# Patient Record
Sex: FEMALE | Race: WHITE | NOT HISPANIC OR LATINO | Employment: FULL TIME | URBAN - METROPOLITAN AREA
[De-identification: names, ages, dates, MRNs, and addresses within clinical notes are randomized per-mention and may not be internally consistent; named-entity substitution may affect disease eponyms.]

---

## 2018-10-26 LAB
HBV SURFACE AG SERPL QL IA: NEGATIVE
HIV 1+2 AB+HIV1 P24 AG SERPL QL IA: NEGATIVE
RUBELLA ANTIBODY IGG QUANTITATIVE: NORMAL IU/ML
TREPONEMA ANTIBODIES: NON REACTIVE

## 2018-11-20 LAB
C TRACH DNA SPEC QL PROBE+SIG AMP: NORMAL
N GONORRHOEA DNA SPEC QL PROBE+SIG AMP: NEGATIVE

## 2019-02-08 LAB — GROUP B STREP PCR: POSITIVE

## 2019-03-14 ENCOUNTER — ANESTHESIA EVENT (OUTPATIENT)
Dept: OBGYN | Facility: CLINIC | Age: 34
End: 2019-03-14
Payer: COMMERCIAL

## 2019-03-14 ENCOUNTER — ANESTHESIA (OUTPATIENT)
Dept: OBGYN | Facility: CLINIC | Age: 34
End: 2019-03-14
Payer: COMMERCIAL

## 2019-03-14 ENCOUNTER — HOSPITAL ENCOUNTER (INPATIENT)
Facility: CLINIC | Age: 34
LOS: 3 days | Discharge: HOME | End: 2019-03-17
Attending: OBSTETRICS & GYNECOLOGY | Admitting: OBSTETRICS & GYNECOLOGY
Payer: COMMERCIAL

## 2019-03-14 PROBLEM — Z36.89 ENCOUNTER FOR TRIAGE IN PREGNANT PATIENT: Status: ACTIVE | Noted: 2019-03-14

## 2019-03-14 LAB
ABO + RH BLD: NORMAL
ABO + RH BLD: NORMAL
AMPHETAMINES UR QL SCN: NEGATIVE
BLD GP AB SCN SERPL QL: NORMAL
BLOOD BANK CMNT PATIENT-IMP: NORMAL
CANNABINOIDS UR QL: NEGATIVE
COCAINE UR QL: NEGATIVE
HGB BLD-MCNC: 11.5 G/DL (ref 11.7–15.7)
OPIATES UR QL SCN: NEGATIVE
PCP UR QL SCN: NEGATIVE
SPECIMEN EXP DATE BLD: NORMAL

## 2019-03-14 PROCEDURE — G0463 HOSPITAL OUTPT CLINIC VISIT: HCPCS

## 2019-03-14 PROCEDURE — 3E0R3BZ INTRODUCTION OF ANESTHETIC AGENT INTO SPINAL CANAL, PERCUTANEOUS APPROACH: ICD-10-PCS | Performed by: ANESTHESIOLOGY

## 2019-03-14 PROCEDURE — 86850 RBC ANTIBODY SCREEN: CPT | Performed by: OBSTETRICS & GYNECOLOGY

## 2019-03-14 PROCEDURE — 00HU33Z INSERTION OF INFUSION DEVICE INTO SPINAL CANAL, PERCUTANEOUS APPROACH: ICD-10-PCS | Performed by: ANESTHESIOLOGY

## 2019-03-14 PROCEDURE — 25000125 ZZHC RX 250: Performed by: OBSTETRICS & GYNECOLOGY

## 2019-03-14 PROCEDURE — 37000011 ZZH ANESTHESIA WARD SERVICE

## 2019-03-14 PROCEDURE — 25000128 H RX IP 250 OP 636: Performed by: ANESTHESIOLOGY

## 2019-03-14 PROCEDURE — 40000671 ZZH STATISTIC ANESTHESIA CASE

## 2019-03-14 PROCEDURE — 25000128 H RX IP 250 OP 636: Performed by: OBSTETRICS & GYNECOLOGY

## 2019-03-14 PROCEDURE — 85018 HEMOGLOBIN: CPT | Performed by: OBSTETRICS & GYNECOLOGY

## 2019-03-14 PROCEDURE — 80307 DRUG TEST PRSMV CHEM ANLYZR: CPT | Performed by: OBSTETRICS & GYNECOLOGY

## 2019-03-14 PROCEDURE — 0064U ANTB TP TOTAL&RPR IA QUAL: CPT | Performed by: OBSTETRICS & GYNECOLOGY

## 2019-03-14 PROCEDURE — 25800030 ZZH RX IP 258 OP 636: Performed by: OBSTETRICS & GYNECOLOGY

## 2019-03-14 PROCEDURE — 10907ZC DRAINAGE OF AMNIOTIC FLUID, THERAPEUTIC FROM PRODUCTS OF CONCEPTION, VIA NATURAL OR ARTIFICIAL OPENING: ICD-10-PCS | Performed by: OBSTETRICS & GYNECOLOGY

## 2019-03-14 PROCEDURE — 12000000 ZZH R&B MED SURG/OB

## 2019-03-14 PROCEDURE — 36415 COLL VENOUS BLD VENIPUNCTURE: CPT | Performed by: OBSTETRICS & GYNECOLOGY

## 2019-03-14 PROCEDURE — 27110038 ZZH RX 271: Performed by: ANESTHESIOLOGY

## 2019-03-14 PROCEDURE — 25800030 ZZH RX IP 258 OP 636: Performed by: ANESTHESIOLOGY

## 2019-03-14 PROCEDURE — 86901 BLOOD TYPING SEROLOGIC RH(D): CPT | Performed by: OBSTETRICS & GYNECOLOGY

## 2019-03-14 PROCEDURE — 86900 BLOOD TYPING SEROLOGIC ABO: CPT | Performed by: OBSTETRICS & GYNECOLOGY

## 2019-03-14 PROCEDURE — 25000125 ZZHC RX 250: Performed by: ANESTHESIOLOGY

## 2019-03-14 RX ORDER — METHYLERGONOVINE MALEATE 0.2 MG/ML
200 INJECTION INTRAVENOUS
Status: DISCONTINUED | OUTPATIENT
Start: 2019-03-14 | End: 2019-03-15

## 2019-03-14 RX ORDER — BUPIVACAINE HYDROCHLORIDE 2.5 MG/ML
INJECTION, SOLUTION INFILTRATION; PERINEURAL PRN
Status: DISCONTINUED | OUTPATIENT
Start: 2019-03-14 | End: 2019-03-14

## 2019-03-14 RX ORDER — FENTANYL CITRATE 50 UG/ML
50-100 INJECTION, SOLUTION INTRAMUSCULAR; INTRAVENOUS
Status: DISCONTINUED | OUTPATIENT
Start: 2019-03-14 | End: 2019-03-15

## 2019-03-14 RX ORDER — NALBUPHINE HYDROCHLORIDE 10 MG/ML
2.5-5 INJECTION, SOLUTION INTRAMUSCULAR; INTRAVENOUS; SUBCUTANEOUS EVERY 6 HOURS PRN
Status: DISCONTINUED | OUTPATIENT
Start: 2019-03-14 | End: 2019-03-15

## 2019-03-14 RX ORDER — CARBOPROST TROMETHAMINE 250 UG/ML
250 INJECTION, SOLUTION INTRAMUSCULAR
Status: DISCONTINUED | OUTPATIENT
Start: 2019-03-14 | End: 2019-03-15

## 2019-03-14 RX ORDER — IBUPROFEN 800 MG/1
800 TABLET, FILM COATED ORAL
Status: DISCONTINUED | OUTPATIENT
Start: 2019-03-14 | End: 2019-03-15

## 2019-03-14 RX ORDER — PENICILLIN G POTASSIUM 5000000 [IU]/1
5 INJECTION, POWDER, FOR SOLUTION INTRAMUSCULAR; INTRAVENOUS ONCE
Status: COMPLETED | OUTPATIENT
Start: 2019-03-14 | End: 2019-03-14

## 2019-03-14 RX ORDER — OXYTOCIN 10 [USP'U]/ML
10 INJECTION, SOLUTION INTRAMUSCULAR; INTRAVENOUS
Status: DISCONTINUED | OUTPATIENT
Start: 2019-03-14 | End: 2019-03-15

## 2019-03-14 RX ORDER — BUPIVACAINE HCL/0.9 % NACL/PF 0.125 %
15 PLASTIC BAG, INJECTION (ML) EPIDURAL CONTINUOUS
Status: DISCONTINUED | OUTPATIENT
Start: 2019-03-14 | End: 2019-03-15

## 2019-03-14 RX ORDER — OXYTOCIN/0.9 % SODIUM CHLORIDE 30/500 ML
100-340 PLASTIC BAG, INJECTION (ML) INTRAVENOUS CONTINUOUS PRN
Status: DISCONTINUED | OUTPATIENT
Start: 2019-03-14 | End: 2019-03-15

## 2019-03-14 RX ORDER — OXYTOCIN/0.9 % SODIUM CHLORIDE 30/500 ML
1-24 PLASTIC BAG, INJECTION (ML) INTRAVENOUS CONTINUOUS
Status: DISCONTINUED | OUTPATIENT
Start: 2019-03-14 | End: 2019-03-15

## 2019-03-14 RX ORDER — TERBUTALINE SULFATE 1 MG/ML
0.25 INJECTION, SOLUTION SUBCUTANEOUS
Status: DISCONTINUED | OUTPATIENT
Start: 2019-03-14 | End: 2019-03-15

## 2019-03-14 RX ORDER — OXYCODONE AND ACETAMINOPHEN 5; 325 MG/1; MG/1
1 TABLET ORAL
Status: DISCONTINUED | OUTPATIENT
Start: 2019-03-14 | End: 2019-03-15

## 2019-03-14 RX ORDER — NALOXONE HYDROCHLORIDE 0.4 MG/ML
.1-.4 INJECTION, SOLUTION INTRAMUSCULAR; INTRAVENOUS; SUBCUTANEOUS
Status: DISCONTINUED | OUTPATIENT
Start: 2019-03-14 | End: 2019-03-15

## 2019-03-14 RX ORDER — SODIUM CHLORIDE, SODIUM LACTATE, POTASSIUM CHLORIDE, CALCIUM CHLORIDE 600; 310; 30; 20 MG/100ML; MG/100ML; MG/100ML; MG/100ML
INJECTION, SOLUTION INTRAVENOUS CONTINUOUS
Status: DISCONTINUED | OUTPATIENT
Start: 2019-03-14 | End: 2019-03-15

## 2019-03-14 RX ORDER — EPHEDRINE SULFATE 50 MG/ML
5 INJECTION, SOLUTION INTRAMUSCULAR; INTRAVENOUS; SUBCUTANEOUS
Status: DISCONTINUED | OUTPATIENT
Start: 2019-03-14 | End: 2019-03-15

## 2019-03-14 RX ORDER — ONDANSETRON 2 MG/ML
4 INJECTION INTRAMUSCULAR; INTRAVENOUS EVERY 6 HOURS PRN
Status: DISCONTINUED | OUTPATIENT
Start: 2019-03-14 | End: 2019-03-15

## 2019-03-14 RX ORDER — LIDOCAINE 40 MG/G
CREAM TOPICAL
Status: DISCONTINUED | OUTPATIENT
Start: 2019-03-14 | End: 2019-03-15

## 2019-03-14 RX ORDER — ACETAMINOPHEN 325 MG/1
650 TABLET ORAL EVERY 4 HOURS PRN
Status: DISCONTINUED | OUTPATIENT
Start: 2019-03-14 | End: 2019-03-15

## 2019-03-14 RX ADMIN — SODIUM CHLORIDE 2.5 MILLION UNITS: 9 INJECTION, SOLUTION INTRAVENOUS at 22:54

## 2019-03-14 RX ADMIN — EPHEDRINE SULFATE 5 MG: 50 INJECTION, SOLUTION INTRAVENOUS at 21:03

## 2019-03-14 RX ADMIN — Medication 15 ML/HR: at 20:12

## 2019-03-14 RX ADMIN — ONDANSETRON 4 MG: 2 INJECTION INTRAMUSCULAR; INTRAVENOUS at 22:16

## 2019-03-14 RX ADMIN — PENICILLIN G POTASSIUM 5 MILLION UNITS: 5000000 POWDER, FOR SOLUTION INTRAMUSCULAR; INTRAPLEURAL; INTRATHECAL; INTRAVENOUS at 19:00

## 2019-03-14 RX ADMIN — OXYTOCIN-SODIUM CHLORIDE 0.9% IV SOLN 30 UNIT/500ML 2 MILLI-UNITS/MIN: 30-0.9/5 SOLUTION at 23:28

## 2019-03-14 RX ADMIN — SODIUM CHLORIDE, POTASSIUM CHLORIDE, SODIUM LACTATE AND CALCIUM CHLORIDE 1000 ML: 600; 310; 30; 20 INJECTION, SOLUTION INTRAVENOUS at 19:15

## 2019-03-14 RX ADMIN — BUPIVACAINE HYDROCHLORIDE 15 ML: 2.5 INJECTION, SOLUTION INFILTRATION; PERINEURAL at 20:02

## 2019-03-14 RX ADMIN — SODIUM CHLORIDE, POTASSIUM CHLORIDE, SODIUM LACTATE AND CALCIUM CHLORIDE: 600; 310; 30; 20 INJECTION, SOLUTION INTRAVENOUS at 20:22

## 2019-03-14 SDOH — HEALTH STABILITY: MENTAL HEALTH: HOW OFTEN DO YOU HAVE A DRINK CONTAINING ALCOHOL?: NEVER

## 2019-03-14 ASSESSMENT — ACTIVITIES OF DAILY LIVING (ADL)
TRANSFERRING: 0-->INDEPENDENT
SWALLOWING: 0-->SWALLOWS FOODS/LIQUIDS WITHOUT DIFFICULTY
RETIRED_COMMUNICATION: 0-->UNDERSTANDS/COMMUNICATES WITHOUT DIFFICULTY
BATHING: 0-->INDEPENDENT
AMBULATION: 0-->INDEPENDENT
RETIRED_EATING: 0-->INDEPENDENT
FALL_HISTORY_WITHIN_LAST_SIX_MONTHS: NO
TOILETING: 0-->INDEPENDENT
COGNITION: 0 - NO COGNITION ISSUES REPORTED
DRESS: 0-->INDEPENDENT

## 2019-03-14 NOTE — PLAN OF CARE
Data: Patient presented to [facility]: 3/14/2019  3:59 PM.  Reason for maternal/fetal assessment is uterine contractions. Patient reports that she has been jarvis for the last few days but today they are stronger and closer together.  Patient is a .  Prenatal record reviewed. Pregnancy has been uncomplicated..  Gestational Age 40w6d. VSS. Fetal movement present. Patient denies leaking of vaginal fluid/rupture of membranes, vaginal bleeding, nausea, vomiting, headache, visual disturbances, epigastric or URQ pain, significant edema. Support person is not present.   Action: Verbal consent for EFM. Triage assessment completed. Bill of rights reviewed.  Response: Patient verbalized agreement with plan. Will contact Dr Elizabeth Weinberg with update and further orders.

## 2019-03-14 NOTE — PROVIDER NOTIFICATION
03/14/19 1627   Provider Notification   Provider Name/Title Dr Weinberg   Method of Notification Phone   Request Evaluate - Remote   Notification Reason Patient Arrived;KEYSHAE     MD updated on pt arrival, SVE and that we have not noted or palpated any contractions at this time. Orders to obtain reactive NST and offer pt to call clinic and set up induction if contractions aren't noted. Call MD with pt decision. Ok to discharge home after reactive NST.

## 2019-03-14 NOTE — PROVIDER NOTIFICATION
03/14/19 1829   Provider Notification   Provider Name/Title Dr Klein    Method of Notification Phone   Request Evaluate - Remote   Notification Reason SVE;Status Update;Pain;Uterine Activity;Labor Status   Dr Klein updated on the progression of labor for Nelya, Orders received to make patient a in Patient. Epidural ok, PCN for + GBS.

## 2019-03-15 LAB
ALT SERPL W P-5'-P-CCNC: 19 U/L (ref 0–50)
AST SERPL W P-5'-P-CCNC: 20 U/L (ref 0–45)
BLOOD BANK CMNT PATIENT-IMP: NORMAL
BLOOD BANK CMNT PATIENT-IMP: NORMAL
CREAT SERPL-MCNC: 0.49 MG/DL (ref 0.52–1.04)
ERYTHROCYTE [DISTWIDTH] IN BLOOD BY AUTOMATED COUNT: 15.8 % (ref 10–15)
GFR SERPL CREATININE-BSD FRML MDRD: >90 ML/MIN/{1.73_M2}
HCT VFR BLD AUTO: 33.8 % (ref 35–47)
HGB BLD-MCNC: 10.4 G/DL (ref 11.7–15.7)
MCH RBC QN AUTO: 27 PG (ref 26.5–33)
MCHC RBC AUTO-ENTMCNC: 30.8 G/DL (ref 31.5–36.5)
MCV RBC AUTO: 88 FL (ref 78–100)
PLATELET # BLD AUTO: 218 10E9/L (ref 150–450)
RBC # BLD AUTO: 3.85 10E12/L (ref 3.8–5.2)
T PALLIDUM AB SER QL: NONREACTIVE
WBC # BLD AUTO: 12.4 10E9/L (ref 4–11)

## 2019-03-15 PROCEDURE — 84450 TRANSFERASE (AST) (SGOT): CPT | Performed by: OBSTETRICS & GYNECOLOGY

## 2019-03-15 PROCEDURE — 25800030 ZZH RX IP 258 OP 636: Performed by: OBSTETRICS & GYNECOLOGY

## 2019-03-15 PROCEDURE — 40000083 ZZH STATISTIC IP LACTATION SERVICES 1-15 MIN

## 2019-03-15 PROCEDURE — 12000000 ZZH R&B MED SURG/OB

## 2019-03-15 PROCEDURE — 72200001 ZZH LABOR CARE VAGINAL DELIVERY SINGLE

## 2019-03-15 PROCEDURE — 25000132 ZZH RX MED GY IP 250 OP 250 PS 637: Performed by: OBSTETRICS & GYNECOLOGY

## 2019-03-15 PROCEDURE — 84460 ALANINE AMINO (ALT) (SGPT): CPT | Performed by: OBSTETRICS & GYNECOLOGY

## 2019-03-15 PROCEDURE — 85027 COMPLETE CBC AUTOMATED: CPT | Performed by: OBSTETRICS & GYNECOLOGY

## 2019-03-15 PROCEDURE — 36415 COLL VENOUS BLD VENIPUNCTURE: CPT | Performed by: OBSTETRICS & GYNECOLOGY

## 2019-03-15 PROCEDURE — 82565 ASSAY OF CREATININE: CPT | Performed by: OBSTETRICS & GYNECOLOGY

## 2019-03-15 PROCEDURE — 25000128 H RX IP 250 OP 636: Performed by: OBSTETRICS & GYNECOLOGY

## 2019-03-15 RX ORDER — ACETAMINOPHEN 325 MG/1
650 TABLET ORAL EVERY 4 HOURS PRN
Status: DISCONTINUED | OUTPATIENT
Start: 2019-03-15 | End: 2019-03-15

## 2019-03-15 RX ORDER — OXYTOCIN 10 [USP'U]/ML
10 INJECTION, SOLUTION INTRAMUSCULAR; INTRAVENOUS
Status: DISCONTINUED | OUTPATIENT
Start: 2019-03-15 | End: 2019-03-15

## 2019-03-15 RX ORDER — OXYTOCIN/0.9 % SODIUM CHLORIDE 30/500 ML
340 PLASTIC BAG, INJECTION (ML) INTRAVENOUS CONTINUOUS PRN
Status: DISCONTINUED | OUTPATIENT
Start: 2019-03-15 | End: 2019-03-15

## 2019-03-15 RX ORDER — OXYTOCIN/0.9 % SODIUM CHLORIDE 30/500 ML
100 PLASTIC BAG, INJECTION (ML) INTRAVENOUS CONTINUOUS
Status: DISCONTINUED | OUTPATIENT
Start: 2019-03-15 | End: 2019-03-15

## 2019-03-15 RX ORDER — OXYTOCIN/0.9 % SODIUM CHLORIDE 30/500 ML
340 PLASTIC BAG, INJECTION (ML) INTRAVENOUS CONTINUOUS PRN
Status: DISCONTINUED | OUTPATIENT
Start: 2019-03-15 | End: 2019-03-17 | Stop reason: HOSPADM

## 2019-03-15 RX ORDER — LANOLIN 100 %
OINTMENT (GRAM) TOPICAL
Status: DISCONTINUED | OUTPATIENT
Start: 2019-03-15 | End: 2019-03-15

## 2019-03-15 RX ORDER — OXYTOCIN/0.9 % SODIUM CHLORIDE 30/500 ML
100 PLASTIC BAG, INJECTION (ML) INTRAVENOUS CONTINUOUS
Status: DISCONTINUED | OUTPATIENT
Start: 2019-03-15 | End: 2019-03-17 | Stop reason: HOSPADM

## 2019-03-15 RX ORDER — BISACODYL 10 MG
10 SUPPOSITORY, RECTAL RECTAL DAILY PRN
Status: DISCONTINUED | OUTPATIENT
Start: 2019-03-17 | End: 2019-03-17 | Stop reason: HOSPADM

## 2019-03-15 RX ORDER — HYDROCORTISONE 2.5 %
CREAM (GRAM) TOPICAL 3 TIMES DAILY PRN
Status: DISCONTINUED | OUTPATIENT
Start: 2019-03-15 | End: 2019-03-15

## 2019-03-15 RX ORDER — OXYTOCIN 10 [USP'U]/ML
10 INJECTION, SOLUTION INTRAMUSCULAR; INTRAVENOUS
Status: DISCONTINUED | OUTPATIENT
Start: 2019-03-15 | End: 2019-03-17 | Stop reason: HOSPADM

## 2019-03-15 RX ORDER — ACETAMINOPHEN 325 MG/1
650 TABLET ORAL EVERY 4 HOURS PRN
Status: DISCONTINUED | OUTPATIENT
Start: 2019-03-15 | End: 2019-03-17 | Stop reason: HOSPADM

## 2019-03-15 RX ORDER — IBUPROFEN 800 MG/1
800 TABLET, FILM COATED ORAL EVERY 6 HOURS PRN
Status: DISCONTINUED | OUTPATIENT
Start: 2019-03-15 | End: 2019-03-15

## 2019-03-15 RX ORDER — NALOXONE HYDROCHLORIDE 0.4 MG/ML
.1-.4 INJECTION, SOLUTION INTRAMUSCULAR; INTRAVENOUS; SUBCUTANEOUS
Status: DISCONTINUED | OUTPATIENT
Start: 2019-03-15 | End: 2019-03-17 | Stop reason: HOSPADM

## 2019-03-15 RX ORDER — IBUPROFEN 800 MG/1
800 TABLET, FILM COATED ORAL EVERY 6 HOURS PRN
Status: DISCONTINUED | OUTPATIENT
Start: 2019-03-15 | End: 2019-03-17 | Stop reason: HOSPADM

## 2019-03-15 RX ORDER — AMOXICILLIN 250 MG
2 CAPSULE ORAL 2 TIMES DAILY
Status: DISCONTINUED | OUTPATIENT
Start: 2019-03-15 | End: 2019-03-17 | Stop reason: HOSPADM

## 2019-03-15 RX ORDER — NALOXONE HYDROCHLORIDE 0.4 MG/ML
.1-.4 INJECTION, SOLUTION INTRAMUSCULAR; INTRAVENOUS; SUBCUTANEOUS
Status: DISCONTINUED | OUTPATIENT
Start: 2019-03-15 | End: 2019-03-15

## 2019-03-15 RX ORDER — HYDROCORTISONE 2.5 %
CREAM (GRAM) TOPICAL 3 TIMES DAILY PRN
Status: DISCONTINUED | OUTPATIENT
Start: 2019-03-15 | End: 2019-03-17 | Stop reason: HOSPADM

## 2019-03-15 RX ORDER — LANOLIN 100 %
OINTMENT (GRAM) TOPICAL
Status: DISCONTINUED | OUTPATIENT
Start: 2019-03-15 | End: 2019-03-17 | Stop reason: HOSPADM

## 2019-03-15 RX ORDER — AMOXICILLIN 250 MG
2 CAPSULE ORAL 2 TIMES DAILY
Status: DISCONTINUED | OUTPATIENT
Start: 2019-03-15 | End: 2019-03-15

## 2019-03-15 RX ORDER — AMOXICILLIN 250 MG
1 CAPSULE ORAL 2 TIMES DAILY
Status: DISCONTINUED | OUTPATIENT
Start: 2019-03-15 | End: 2019-03-15

## 2019-03-15 RX ORDER — AMOXICILLIN 250 MG
1 CAPSULE ORAL 2 TIMES DAILY
Status: DISCONTINUED | OUTPATIENT
Start: 2019-03-15 | End: 2019-03-17 | Stop reason: HOSPADM

## 2019-03-15 RX ORDER — BISACODYL 10 MG
10 SUPPOSITORY, RECTAL RECTAL DAILY PRN
Status: DISCONTINUED | OUTPATIENT
Start: 2019-03-17 | End: 2019-03-15

## 2019-03-15 RX ADMIN — SENNOSIDES AND DOCUSATE SODIUM 1 TABLET: 8.6; 5 TABLET ORAL at 08:32

## 2019-03-15 RX ADMIN — IBUPROFEN 800 MG: 800 TABLET ORAL at 12:12

## 2019-03-15 RX ADMIN — ACETAMINOPHEN 650 MG: 325 TABLET, FILM COATED ORAL at 10:59

## 2019-03-15 RX ADMIN — SODIUM CHLORIDE 2.5 MILLION UNITS: 9 INJECTION, SOLUTION INTRAVENOUS at 02:55

## 2019-03-15 RX ADMIN — IBUPROFEN 800 MG: 800 TABLET ORAL at 05:16

## 2019-03-15 RX ADMIN — SENNOSIDES AND DOCUSATE SODIUM 1 TABLET: 8.6; 5 TABLET ORAL at 21:33

## 2019-03-15 RX ADMIN — ACETAMINOPHEN 650 MG: 325 TABLET, FILM COATED ORAL at 16:54

## 2019-03-15 RX ADMIN — IBUPROFEN 800 MG: 800 TABLET ORAL at 18:11

## 2019-03-15 RX ADMIN — ACETAMINOPHEN 650 MG: 325 TABLET, FILM COATED ORAL at 21:36

## 2019-03-15 RX ADMIN — SODIUM CHLORIDE, POTASSIUM CHLORIDE, SODIUM LACTATE AND CALCIUM CHLORIDE: 600; 310; 30; 20 INJECTION, SOLUTION INTRAVENOUS at 02:15

## 2019-03-15 NOTE — PROVIDER NOTIFICATION
03/14/19 2300   Provider Notification   Provider Name/Title Dr. Klein   Method of Notification Phone   Request Evaluate - Remote   Notification Reason SVE;Status Update;Labor Status;Uterine Activity   SVE 6.5/70/-2. CTX q 4- 16 minutes. Patient comfortable with epidural. 2nd dose of antibiotics infusing. Orders received for Pitocin augmentation. POC discussed with patient and significant other.

## 2019-03-15 NOTE — ANESTHESIA POSTPROCEDURE EVALUATION
Patient: Chris Santo      S/P epidural for labor.   I or my partner was immediately available for management of this patient during epidural analgesia infusion.  VSS.  Doing well. Block resolved.  Neuro at baseline. Denies positional headache. Minimal side effects easily managed w/ PRN meds. No apparent anesthetic complications. No follow-up required.    Mac Delacruz MD    Note:  Anesthesia Post Evaluation    Last vitals:  Vitals:    03/15/19 0603 03/15/19 0618 03/15/19 0633   BP: 145/81 139/77 144/76   Resp: 16 16    Temp:            Electronically Signed By: Mac Delacruz MD  March 15, 2019  7:51 AM

## 2019-03-15 NOTE — PROGRESS NOTES
Patient unavailable as medical provider in room when Public Health Nurse (PHN) stopped by to discuss [facility] Public Health resources.

## 2019-03-15 NOTE — ANESTHESIA PREPROCEDURE EVALUATION
"Anesthesia Pre-Procedure Evaluation    Patient: Chris Santo   MRN: [id number] : 1985          Preoperative Diagnosis: * No surgery found *        History reviewed. No pertinent past medical history.  History reviewed. No pertinent surgical history.  Anesthesia Evaluation       history and physical reviewed .             ROS/MED HX    ENT/Pulmonary:  - neg pulmonary ROS     Neurologic:  - neg neurologic ROS     Cardiovascular:  - neg cardiovascular ROS       METS/Exercise Tolerance:     Hematologic:         Musculoskeletal:         GI/Hepatic:  - neg GI/hepatic ROS       Renal/Genitourinary:         Endo:         Psychiatric:         Infectious Disease:         Malignancy:         Other:                           Class 3 obesity      Physical Exam  Normal systems: cardiovascular, pulmonary and dental    Airway   Mallampati: II  TM distance: > 3 FB  Neck ROM: full  Mouth opening: > 3 cm    Dental     Cardiovascular       Pulmonary     Other findings: Lab Test        19                       1835          0710          0745          HGB          11.5*        9.7*         9.7*           No lab results found.                Lab Results   Component Value Date    HGB 11.5 (L) 2019       Preop Vitals  BP Readings from Last 3 Encounters:   14 114/67    Pulse Readings from Last 3 Encounters:   14 91      Resp Readings from Last 3 Encounters:   19 16   14 16    SpO2 Readings from Last 3 Encounters:   No data found for SpO2      Temp Readings from Last 1 Encounters:   19 98.3  F (36.8  C) (Oral)    Ht Readings from Last 1 Encounters:   14 1.676 m (5' 6\")      Wt Readings from Last 1 Encounters:   14 113.4 kg (250 lb)    Estimated body mass index is 40.35 kg/m  as calculated from the following:    Height as of 14: 1.676 m (5' 6\").    Weight as of 14: 113.4 kg (250 lb).       Anesthesia Plan      History & Physical " Review      ASA Status:  .  OB Epidural Asa: 3            Postoperative Care      Consents  Anesthetic plan, risks, benefits and alternatives discussed with:  Patient..                 Jose Ramon Adrian MD                    .

## 2019-03-15 NOTE — PROVIDER NOTIFICATION
03/14/19 1940   Provider Notification   Provider Name/Title Dr. Klein   Method of Notification Phone   Request Evaluate - Remote   Notification Reason SVE;Pain;Labor Status;Uterine Activity   SVE 5/70/-2. Awaiting epidural. Will update MD with changes.

## 2019-03-15 NOTE — LACTATION NOTE
Lactation visit. Mom reports baby is NW and no pain even with mild tongue tie. Encouraged mom to call us PRN.

## 2019-03-15 NOTE — PROVIDER NOTIFICATION
03/15/19 0520   Provider Notification   Provider Name/Title Dr. Klein   Method of Notification In Department   Request Evaluate - Remote   Notification Reason Lab/Diagnostic Study;Maternal Vital Sign Change   MD notified of elevated BPs. Verbal orders received for [facility] labs drawn.

## 2019-03-15 NOTE — PLAN OF CARE
Pt admitted to 450 at change of shift. Iv to saline lock at 0830. Vss. Flow small rubra. Denies pain. Pt oriented to room, call light in reach. Pt to call for help to bathroom. Pt up to bathroom at 1100 with assist of one, complaining of slight dizziness when up. Fundus firm at U/1, flow small to moderate rubra. Tylenol given per pt request.

## 2019-03-15 NOTE — PLAN OF CARE
Data: Chris Santo transferred to 450 via wheelchair at 0650. Baby transferred via parent's arms.  Action: Receiving unit notified of transfer: Yes. Patient and family notified of room change. Report given to Janie RN at 0715. Belongings sent to receiving unit. Accompanied by Registered Nurse. Oriented patient to surroundings. Call light within reach. ID bands double-checked with receiving RN.  Response: Patient tolerated transfer and is stable.

## 2019-03-15 NOTE — ANESTHESIA PROCEDURE NOTES
Peripheral nerve/Neuraxial procedure note :        Assessment/Narrative  .  .  . Comments:  Pt seen and interviewed prior to epidural placement for labor analgesia.  This epidural is to be placed in anticipation of normal vaginal delivery.  Risk discussed and consent given prior to performance of procedure.  Time out consisting of identification of patient and desire for epidural analgesia was confirmed.  Sterile prep of lumbar spine was accomplished with povidone iodine three times.  L34 interspace was identified.  Local anesthetic infiltration with 1.5% lido with epi 1/200k was performed with 1.5 cc.  17 G Tuohy needle was advanced in the midline with loss of resistance technique.  No CSF, blood, or paresthesias were noted with placement.  Test dose of 1.5% Lidocaine with epi 1/200k, 3 cc., was injected without evidence of intrathecal or intravascular injection.  Incremental bolus of 0.25% Bupivicaine was injected to a total volume of 15 cc.  Epidural cath 19 G was advanced through needle approx. 6 cm.  No paresthesia was noted with placement and aspiration of cath was negative for blood.  Catheter secured with tegaderm and tape.  Epidural infusion begun after double check of pump settings.  Nursing to inform if there are any complications, but none were noted prior to leaving patient room.  I, or my partners, remain immediately available for management of any issues or complications.  I, or my partners, will monitor at appropriate intervals.  ERNESTO Adrian MD

## 2019-03-15 NOTE — L&D DELIVERY NOTE
Delivery Summary    Chris Santo MRN# [id number]   Age: 34 year old YOB: 1985     ASSESSMENT & PLAN: Routine PPOB Vaginal Delivery Note      HPI:  Pt is a 34 year old  @ 41w0d who presented to L&D on 3/14/2019 for early labor.            Prenatal labs:  B+ antibody screen: negative, Rubella immune,  Hep B/HIV/RPR all negative, GC/CT negative, , GBS positive    Pregnancy complications:  Macrosomia, +GBS,BMI>40.    Hospital Course:    First Stage: On admission, contractions were every 2-4 minutes and patient was 60%/3-4/-2 dilated. FHTs were in the 165 with accelerations present. moderate variability,  no decelerations noted.   Abdomen was non-tender.  EFW was 9.5# by Leopold's.  Patient's labor slowly progressed.    At the patient's request, she received epidural  analgesia.  She did receive pitocin for augmentation of labor of labor, to a maximum of 5mu/min.  AROM occurred at 0415 with meconium stained fluid noted.  Patient reached complete cervical dilation at 0406 on 3/15/2019.    Second Stage:  Patient did not  labor down , and began pushing at 0416.  Good maternal expulsive efforts were noted.  Fetal heart tones remained reassuring during the second stage.  Baseline 135, with moderate variability, no decelerations noted.  She was able to bring the fetal vertex to a full crown.  The fetal vertex was then easily delivered, followed by the fetal shoulders without complications.  A  nuchal cord was not present.  A female infant was then delivered with mild shoulder dystocia at 0428 on 3/15/2019. There was a compound delivery with a hand presenting. The baby deliveredafter 30 seconds. The mouth and nares were bulb suctioned.  The cord was clamped after it had stopped pulsating, cut and the infant was placed on the mother's abdomen.  The infant's weight was 9 pounds 6 ounces.  Apgars were 9 and 9 at one and five minutes .       Third Stage:  The placenta then delivered at 0447 .  It  "was noted to be intact with a three vessel cord.  The patient's perineum was inspected and was intact .  The area was infiltrated with 1% lidocaine and repaired in the usual fashion using 3-0 vicryl suture.  EBL for the procedure was 908cc.    Sponge and needle counts were correct.  The patient and infant remained in the delivery suite following delivery in stable condition.    Elen Klein MD  3/15/2019  5:27 AM               Labor Event Times    Labor onset date:  3/15/19 Onset time:   4:18 PM   Dilation complete date:  3/15/19 Complete time:   4:06 AM   Start pushing date/time:  3/15/2019 0416      Rupture date/time: 3/15/19 0415   Rupture type:  Artificial Rupture of Membranes  Fluid color:  Meconium     Delivery/Placenta Date and Time    Delivery Date:  3/15/19 Delivery Time:   4:28 AM   Placenta Date/Time:  3/15/2019  4:47 AM  Oxytocin given at the time of delivery:  after delivery of baby     Vaginal Counts     Initial count performed by 2 team members:   Two Team Members   BELLE Mayorga       Crockett Suture Crockett Sponges Instruments   Initial counts 2  5    Added to count       Final counts 2  5    Placed during labor Accounted for at the end of labor   No NA   No NA   No NA    Final count performed by 2 team members:   Two Team Members   BELLE amaro Dr.      Final count correct?:  Yes     Apgars    Living status:  Living   1 Minute 5 Minute 10 Minute 15 Minute 20 Minute   Skin color: 1  1       Heart rate: 2  2       Reflex irritability: 2  2       Muscle tone: 2  2       Respiratory effort: 2  2       Total: 9  9       Apgars assigned by:  RAUL NGUYEN     Cord    Vessels:  3 Vessels Complications:  None   Cord Blood Disposition:  Lab Gases Sent?:  No      Bandy Resuscitation    Methods:  None  Output in Delivery Room:  Stool      Measurements    Weight:  9 lb 5.9 oz Length:  1' 9.5\"   Head circumference:  34.3 cm       Labor Events and Shoulder " Dystocia    Fetal Tracing Prior to Delivery:  Category 2  Shoulder dystocia present?:  Pos  Time recognized:  3/15/2019 0427    Time help called:  3/15/2019 0427    Additional staff arrived:  3/15/2019 0425       First Maneuver:  Marah maneuver    Time performed:  3/15/2019 0428       Delivery (Maternal) (Provider to Complete) (058235)    Episiotomy:  None  Perineal lacerations:  None       Blood Loss  Mother: Chris Santo #[id number]   Start of Mother's Information    IO Blood Loss  03/15/19 1618 - 03/15/19 0521    None           End of Mother's Information  Mother: hCris Santo #[id number]         Delivery - Provider to Complete (094954)    Attempted Delivery Types (Choose all that apply):  Spontaneous Vaginal Delivery  Delivery Type (Choose the 1 that will go to the Birth History):  Vaginal, Spontaneous   Other personnel:   Provider Role   Dasha Childers RN Registered Nurse         Placenta    Immediate Cord Clamping:  Done  Date/Time:  3/15/2019  4:47 AM  Removal:  Spontaneous  Disposition:  Hospital disposal     Anesthesia    Method:  Epidural   Analgesic:   BIRTH HISTORY: ANALGESIC   BUPIVACAINE 0.125%  ML NS EPIDURAL DRIP         Presentation and Position    Presentation:  Vertex  Position:  Right Occiput Anterior           Elen Klein MD

## 2019-03-16 LAB — HGB BLD-MCNC: 9.1 G/DL (ref 11.7–15.7)

## 2019-03-16 PROCEDURE — 25000132 ZZH RX MED GY IP 250 OP 250 PS 637: Performed by: OBSTETRICS & GYNECOLOGY

## 2019-03-16 PROCEDURE — 40000083 ZZH STATISTIC IP LACTATION SERVICES 1-15 MIN

## 2019-03-16 PROCEDURE — 85018 HEMOGLOBIN: CPT | Performed by: OBSTETRICS & GYNECOLOGY

## 2019-03-16 PROCEDURE — 36415 COLL VENOUS BLD VENIPUNCTURE: CPT | Performed by: OBSTETRICS & GYNECOLOGY

## 2019-03-16 PROCEDURE — 12000000 ZZH R&B MED SURG/OB

## 2019-03-16 RX ADMIN — Medication 140 MG: at 08:57

## 2019-03-16 RX ADMIN — IBUPROFEN 800 MG: 800 TABLET ORAL at 22:27

## 2019-03-16 RX ADMIN — IBUPROFEN 800 MG: 800 TABLET ORAL at 15:41

## 2019-03-16 RX ADMIN — SENNOSIDES AND DOCUSATE SODIUM 1 TABLET: 8.6; 5 TABLET ORAL at 08:57

## 2019-03-16 RX ADMIN — SENNOSIDES AND DOCUSATE SODIUM 1 TABLET: 8.6; 5 TABLET ORAL at 20:52

## 2019-03-16 RX ADMIN — IBUPROFEN 800 MG: 800 TABLET ORAL at 02:48

## 2019-03-16 RX ADMIN — IBUPROFEN 800 MG: 800 TABLET ORAL at 08:57

## 2019-03-16 RX ADMIN — ACETAMINOPHEN 650 MG: 325 TABLET, FILM COATED ORAL at 19:30

## 2019-03-16 RX ADMIN — ACETAMINOPHEN 650 MG: 325 TABLET, FILM COATED ORAL at 02:48

## 2019-03-16 NOTE — PLAN OF CARE
Patient meeting expected outcomes. Pain adequately managed with tylenol and ibuprofen. Breastfeeding well, also formula feeding via bottle. Independent with self and  cares.

## 2019-03-16 NOTE — PLAN OF CARE
Patient meeting expected goals. Is up independent in room, meeting all personal and infant needs. VSS. Pain is being managed with Tylenol and Ibuprofen.  Is both breastfeeding infant and bottle feeding formula.  Encouraged to feed infant every 2-3 hours; waking infant up. Patient agreed.

## 2019-03-16 NOTE — PROGRESS NOTES
OB Post-partum Note  PPD# 1    S:  Patient doing well.  Pain controlled.  Voiding.  Bleeding is normal.  Breast feeding.    O:  /79   Pulse 93   Temp 97.9  F (36.6  C) (Oral)   Resp 18   Breastfeeding? yes  Gen- A&O, NAD  Abd- Non-tender, fundus non tender and firm   Ext- non-tender, no calf pain    Hemoglobin   Date Value Ref Range Status   2019 9.1 (L) 11.7 - 15.7 g/dL Final     B  Rubella Immune    A/P: 34 year old  PPD# 1 s/p     1.  Routine post-partum cares  2.  Analgesia tylenol and ibuprofen  3.  Discharge tomorrow  4.  The plan of care was discussed with the patient.  She expressed understanding and agreement  5. Blood loss anemia - Fe supplement.      Cierra Abdul  3/16/2019  8:19 AM

## 2019-03-16 NOTE — PLAN OF CARE
Cramping pain is controlled with ibuprofen and tylenol, up and moving well in room and hallway, feeding infant with formula, has had visitors this evening, encouraged to fill out the paperwork, cooperative, pleasant, bonding with baby well.

## 2019-03-17 VITALS
HEART RATE: 84 BPM | DIASTOLIC BLOOD PRESSURE: 74 MMHG | RESPIRATION RATE: 18 BRPM | TEMPERATURE: 98.4 F | SYSTOLIC BLOOD PRESSURE: 135 MMHG

## 2019-03-17 PROCEDURE — 25000132 ZZH RX MED GY IP 250 OP 250 PS 637: Performed by: OBSTETRICS & GYNECOLOGY

## 2019-03-17 RX ORDER — ACETAMINOPHEN 325 MG/1
650 TABLET ORAL EVERY 4 HOURS PRN
Qty: 60 TABLET | Refills: 0 | Status: SHIPPED | OUTPATIENT
Start: 2019-03-17

## 2019-03-17 RX ORDER — IBUPROFEN 800 MG/1
800 TABLET, FILM COATED ORAL EVERY 6 HOURS PRN
Qty: 40 TABLET | Refills: 0 | Status: ON HOLD | OUTPATIENT
Start: 2019-03-17 | End: 2022-09-22

## 2019-03-17 RX ORDER — BREAST PUMP
1 EACH MISCELLANEOUS PRN
Qty: 1 EACH | Refills: 0 | Status: SHIPPED | OUTPATIENT
Start: 2019-03-17

## 2019-03-17 RX ADMIN — SENNOSIDES AND DOCUSATE SODIUM 1 TABLET: 8.6; 5 TABLET ORAL at 10:42

## 2019-03-17 RX ADMIN — Medication 140 MG: at 10:42

## 2019-03-17 RX ADMIN — IBUPROFEN 800 MG: 800 TABLET ORAL at 04:14

## 2019-03-17 RX ADMIN — IBUPROFEN 800 MG: 800 TABLET ORAL at 10:42

## 2019-03-17 NOTE — PROGRESS NOTES
All discharge instructions were reviewed with patient by writer and all questions answered. Patient left unit with all belongings and infant at 1338.

## 2019-03-17 NOTE — PLAN OF CARE
Patient meeting expected outcomes. Breastfeeding and formula feeding  which  is tolerating well. Pain well controlled with ibuprofen. Independent with self and  cares. Anticipate discharge home with  today.

## 2019-03-17 NOTE — DISCHARGE INSTRUCTIONS
Postpartum Vaginal Delivery Instructions  Follow up in 6 weeks for postpartum visit.  Lactation: [phone]    Activity       Ask family and friends for help when you need it.    Do not place anything in your vagina for 6 weeks.    You are not restricted on other activities, but take it easy for a few weeks to allow your body to recover from delivery.  You are able to do any activities you feel up to that point.    No driving until you have stopped taking your pain medications (usually two weeks after delivery).     Call your health care provider if you have any of these symptoms:       Increased pain, swelling, redness, or fluid around your stiches from an episiotomy or perineal tear.    A fever above 100.4 F (38 C) with or without chills when placing a thermometer under your tongue.    You soak a sanitary pad with blood within 1 hour, or you see blood clots larger than a golf ball.    Bleeding that lasts more than 6 weeks.    Vaginal discharge that smells bad.    Severe pain, cramping or tenderness in your lower belly area.    A need to urinate more frequently (use the toilet more often), more urgently (use the toilet very quickly), or it burns when you urinate.    Nausea and vomiting.    Redness, swelling or pain around a vein in your leg.    Problems breastfeeding or a red or painful area on your breast.    Chest pain and cough or are gasping for air.    Problems coping with sadness, anxiety, or depression.  If you have any concerns about hurting yourself or the baby, call your provider immediately.     You have questions or concerns after you return home.     Keep your hands clean:  Always wash your hands before touching your perineal area and stitches.  This helps reduce your risk of infection.  If your hands aren't dirty, you may use an alcohol hand-rub to clean your hands. Keep your nails clean and short.

## 2019-03-17 NOTE — PLAN OF CARE
VSS,cramping pain is controlled with ibuprofen and tylenol, warm pack is given as well, breast feeding and supplementing with formula, talked about discharge expectations, turned in the birth certificate and PPD (scored # 0), explained to recheck in 1-2 weeks, continues bonding with baby well.

## 2019-03-17 NOTE — PROGRESS NOTES
OB Post-partum Note  PPD# 2    S:  Patient doing well.  Pain controlled.  Voiding.  Bleeding is normal.  Breast feeding.    O:  /76   Pulse 94   Temp 97.9  F (36.6  C) (Oral)   Resp 18   Breastfeeding? yes  Gen- A&O, NAD  Abd- Non-tender, fundus non tender and firm   Ext- non-tender, no calf pain    Hemoglobin   Date Value Ref Range Status   2019 9.1 (L) 11.7 - 15.7 g/dL Final     B  Neg - baby Rh neg  Rubella Immune    A/P: 34 year old  PPD# 2 s/p     1.  Routine post-partum cares  2.  Analgesia tylenol and ibuprofen   3.  Discharge today  4.  The plan of care was discussed with the patient.  She expressed understanding and agreement  5.  RTC in 6 weeks  6.  Indications to call or return were discussed. Post partum instructions were given  7. Blood loss anemia - Fe supplement.    Cierra Abdul  3/17/2019  9:16 AM

## 2019-03-17 NOTE — LACTATION NOTE
Lactation in to see patient. Patient states baby nursing well. Reviewed basic breast feeding information. Has nursed her other babies with no issues and a good milk supply. Knows to call for questions or concerns.

## 2019-03-17 NOTE — PLAN OF CARE
Patient meeting expected goals. Is up independent in room, meeting all personal and infant needs. VSS. Pain is being managed with Tylenol and Ibuprofen. Is both breastfeeding and bottle feeding infant with formula.  Bonding well with infant.  Patient is stable and will be ready for discharge later today. Breast pump will be issued.  Patient is aware to follow up in 6 weeks for postpartum visit or earlier for any issues.

## 2021-02-24 ENCOUNTER — RECORDS - HEALTHEAST (OUTPATIENT)
Dept: ADMINISTRATIVE | Facility: OTHER | Age: 36
End: 2021-02-24

## 2021-04-07 ENCOUNTER — AMBULATORY - HEALTHEAST (OUTPATIENT)
Dept: ADMINISTRATIVE | Facility: CLINIC | Age: 36
End: 2021-04-07

## 2021-04-07 DIAGNOSIS — K80.20 GALLSTONES: ICD-10-CM

## 2021-04-13 ENCOUNTER — RECORDS - HEALTHEAST (OUTPATIENT)
Dept: RADIOLOGY | Facility: CLINIC | Age: 36
End: 2021-04-13

## 2021-04-19 ENCOUNTER — COMMUNICATION - HEALTHEAST (OUTPATIENT)
Dept: SURGERY | Facility: CLINIC | Age: 36
End: 2021-04-19

## 2021-04-19 ENCOUNTER — OFFICE VISIT - HEALTHEAST (OUTPATIENT)
Dept: SURGERY | Facility: CLINIC | Age: 36
End: 2021-04-19

## 2021-04-19 DIAGNOSIS — K80.20 SYMPTOMATIC CHOLELITHIASIS: ICD-10-CM

## 2021-04-20 ENCOUNTER — AMBULATORY - HEALTHEAST (OUTPATIENT)
Dept: SURGERY | Facility: AMBULATORY SURGERY CENTER | Age: 36
End: 2021-04-20

## 2021-04-20 DIAGNOSIS — Z11.59 ENCOUNTER FOR SCREENING FOR OTHER VIRAL DISEASES: ICD-10-CM

## 2021-05-10 DIAGNOSIS — Z01.818 PRE-OP EXAM: Primary | ICD-10-CM

## 2021-05-10 LAB
SARS-COV-2 RNA RESP QL NAA+PROBE: NORMAL
SPECIMEN SOURCE: NORMAL

## 2021-05-10 PROCEDURE — U0003 INFECTIOUS AGENT DETECTION BY NUCLEIC ACID (DNA OR RNA); SEVERE ACUTE RESPIRATORY SYNDROME CORONAVIRUS 2 (SARS-COV-2) (CORONAVIRUS DISEASE [COVID-19]), AMPLIFIED PROBE TECHNIQUE, MAKING USE OF HIGH THROUGHPUT TECHNOLOGIES AS DESCRIBED BY CMS-2020-01-R: HCPCS | Performed by: SPECIALIST

## 2021-05-10 PROCEDURE — U0005 INFEC AGEN DETEC AMPLI PROBE: HCPCS | Performed by: SPECIALIST

## 2021-05-11 ENCOUNTER — TELEPHONE (OUTPATIENT)
Dept: EMERGENCY MEDICINE | Facility: CLINIC | Age: 36
End: 2021-05-11

## 2021-05-11 LAB
LABORATORY COMMENT REPORT: ABNORMAL
SARS-COV-2 RNA RESP QL NAA+PROBE: POSITIVE
SPECIMEN SOURCE: ABNORMAL

## 2021-05-11 NOTE — TELEPHONE ENCOUNTER
"-Coronavirus (COVID-19) Notification    Caller Name (Patient, parent, daughter/son, grandparent, etc)  patient    Reason for call  Notify of Positive Coronavirus (COVID-19) lab results, assess symptoms,  review  [facility] recommendations    Lab Result    Lab test:  2019-nCoV rRt-PCR or SARS-CoV-2 PCR    Oropharyngeal AND/OR nasopharyngeal swabs is POSITIVE for 2019-nCoV RNA/SARS-COV-2 PCR (COVID-19 virus)    RN Recommendations/Instructions per [facility] Coronavirus COVID-19 recommendations    Brief introduction script  Introduce self then review script:  \"I am calling on behalf of [employer].  We were notified that your Coronavirus test (COVID-19) for was POSITIVE for the virus.  I have some information to relay to you but first I wanted to mention that the [place] Dept of Health will be contacting you shortly [it's possible MD already called Patient] to talk to you more about how you are feeling and other people you have had contact with who might now also have the virus.  Also,  [facility] is Partnering with the [facility] for Covid-19 research, you may be contacted directly by research staff.\"    Assessment (Inquire about Patient's current symptoms)   Assessment   Current Symptoms at time of phone call: (if no symptoms, document No symptoms] No symptoms   Symptoms onset (if applicable) Tested 5/10/2021     If at time of call, Patients symptoms hare worsened, the Patient should contact 911 or have someone drive them to Emergency Dept promptly:      If Patient calling 911, inform 911 personal that you have tested positive for the Coronavirus (COVID-19).  Place mask on and await 911 to arrive.    If Emergency Dept, If possible, please have another adult drive you to the Emergency Dept but you need to wear mask when in contact with other people.      Monoclonal Antibody Administration    You may be eligible to receive a new treatment with a monoclonal antibody for preventing " "hospitalization in patients at high risk for complications from COVID-19.   This medication is still experimental and available on a limited basis; it is given through an IV and must be given at an infusion center. Please note that not all people who are eligible will receive the medication since it is in limited supply.     Are you interested in being considered for this medication?  No.   Does the patient fit the criteria: No    If patient qualifies based on above criteria:  \"You will be contacted if you are selected to receive this treatment in the next 1-2 business days.   This is time sensitive and if you are not selected in the next 1-2 business days, you will not receive the medication.  If you do not receive a call to schedule, you have not been selected.\"      Review information with Patient    Your result was positive. This means you have COVID-19 (coronavirus).  We have sent you a letter that reviews the information that I'll be reviewing with you now.    How can I protect others?    If you have symptoms: stay home and away from others (self-isolate) until:    You've had no fever--and no medicine that reduces fever--for 1 full day (24 hours). And       Your other symptoms have gotten better. For example, your cough or breathing has improved. And     At least 10 days have passed since your symptoms started. (If you've been told by a doctor that you have a weak immune system, wait 20 days.)     If you don't have symptoms: Stay home and away from others (self-isolate) until at least 10 days have passed since your first positive COVID-19 test. (Date test collected)    During this time:    Stay in your own room, including for meals. Use your own bathroom if you can.    Stay away from others in your home. No hugging, kissing or shaking hands. No visitors.     Don't go to work, school or anywhere else.     Clean  high touch  surfaces often (doorknobs, counters, handles, etc.). Use a household cleaning spray or " wipes. You'll find a full list on the EPA website at www.epa.gov/pesticide-registration/list-n-disinfectants-use-against-sars-cov-2.     Cover your mouth and nose with a mask, tissue or other face covering to avoid spreading germs.    Wash your hands and face often with soap and water.    Make a list of people you have been in close contact with recently, even if either of you wore a face covering.   ; Start your list from 2 days before you became ill or had a positive test.  ; Include anyone that was within 6 feet of you for a cumulative total of 15 minutes or more in 24 hours. (Example: if you sat next to Aditya for 5 minutes in the morning and 10 minutes in the afternoon, then you were in close contact for 15 minutes total that day. Aditya would be added to your list.)    A public health worker will call or text you. It is important that you answer. They will ask you questions about possible exposures to COVID-19, such as people you have been in direct contact with and places you have visited.    Tell the people on your list that you have COVID-19; they should stay away from others for 14 days starting from the last time they were in contact with you (unless you are told something different from a public health worker).     Caregivers in these groups are at risk for severe illness due to COVID-19:  o People 65 years and older  o People who live in a nursing home or long-term care facility  o People with chronic disease (lung, heart, cancer, diabetes, kidney, liver, immunologic)  o People who have a weakened immune system, including those who:  - Are in cancer treatment  - Take medicine that weakens the immune system, such as corticosteroids  - Had a bone marrow or organ transplant  - Have an immune deficiency  - Have poorly controlled HIV or AIDS  - Are obese (body mass index of 40 or higher)  - Smoke regularly    Caregivers should wear gloves while washing dishes, handling laundry and cleaning bedrooms and  bathrooms.    Wash and dry laundry with special caution. Don't shake dirty laundry, and use the warmest water setting you can.    If you have a weakened immune system, ask your doctor about other actions you should take.    For more tips, go to www.cdc.gov/coronavirus/2019-ncov/downloads/10Things.pdf.    You should not go back to work until you meet the guidelines above for ending your home isolation. You don't need to be retested for COVID-19 before going back to work--studies show that you won't spread the virus if it's been at least 10 days since your symptoms started (or 20 days, if you have a weak immune system).    Employers: This document serves as formal notice of your employee's medical guidelines for going back to work. They must meet the above guidelines before going back to work in person.    How can I take care of myself?    1. Get lots of rest. Drink extra fluids (unless a doctor has told you not to).    2. Take Tylenol (acetaminophen) for fever or pain. If you have liver or kidney problems, ask your family doctor if it's okay to take Tylenol.     Take either:     650 mg (two 325 mg pills) every 4 to 6 hours, or     1,000 mg (two 500 mg pills) every 8 hours as needed.     Note: Don't take more than 3,000 mg in one day. Acetaminophen is found in many medicines (both prescribed and over-the-counter medicines). Read all labels to be sure you don't take too much.    For children, check the Tylenol bottle for the right dose (based on their age or weight).    3. If you have other health problems (like cancer, heart failure, an organ transplant or severe kidney disease): Call your specialty clinic if you don't feel better in the next 2 days.    4. Know when to call 911: Emergency warning signs include:    Trouble breathing or shortness of breath    Pain or pressure in the chest that doesn't go away    Feeling confused like you haven't felt before, or not being able to wake up    Bluish-colored lips or  face    5. Sign up for [employer]. We know it's scary to hear that you have COVID-19. We want to track your symptoms to make sure you're okay over the next 2 weeks. Please look for an email from [employer]--this is a free, online program that we'll use to keep in touch. To sign up, follow the link in the email. Learn more at [email or web address].    Where can I get more information?    [facility]: [email or web address]    Coronavirus Basics: www.[email or web address]./diseases/coronavirus/basics.html    What to Do If You're Sick: www.cdc.gov/coronavirus/2019-ncov/about/steps-when-sick.html    Ending Home Isolation: www.cdc.gov/coronavirus/2019-ncov/hcp/disposition-in-home-patients.html     Caring for Someone with COVID-19: www.cdc.gov/coronavirus/2019-ncov/if-you-are-sick/care-for-someone.html     [facility] clinical trials (COVID-19 research studies): [email or web address]-[email or web address]-trials     A Positive COVID-19 letter will be sent via [employer] or the mail. (Exception, no letters sent to Presurgerical/Preprocedure Patients)    Teresa Kim LPN

## 2021-05-13 ENCOUNTER — AMBULATORY - HEALTHEAST (OUTPATIENT)
Dept: SURGERY | Facility: AMBULATORY SURGERY CENTER | Age: 36
End: 2021-05-13

## 2021-05-13 DIAGNOSIS — Z11.59 ENCOUNTER FOR SCREENING FOR OTHER VIRAL DISEASES: ICD-10-CM

## 2021-06-01 ENCOUNTER — RECORDS - HEALTHEAST (OUTPATIENT)
Dept: ADMINISTRATIVE | Facility: OTHER | Age: 36
End: 2021-06-01

## 2021-06-01 ASSESSMENT — MIFFLIN-ST. JEOR: SCORE: 2009.5

## 2021-06-02 ENCOUNTER — RECORDS - HEALTHEAST (OUTPATIENT)
Dept: ADMINISTRATIVE | Facility: OTHER | Age: 36
End: 2021-06-02

## 2021-06-03 ENCOUNTER — RECORDS - HEALTHEAST (OUTPATIENT)
Dept: ADMINISTRATIVE | Facility: OTHER | Age: 36
End: 2021-06-03

## 2021-06-03 ENCOUNTER — SURGERY - HEALTHEAST (OUTPATIENT)
Dept: SURGERY | Facility: AMBULATORY SURGERY CENTER | Age: 36
End: 2021-06-03
Payer: COMMERCIAL

## 2021-06-03 ENCOUNTER — ANESTHESIA - HEALTHEAST (OUTPATIENT)
Dept: SURGERY | Facility: AMBULATORY SURGERY CENTER | Age: 36
End: 2021-06-03

## 2021-06-03 ASSESSMENT — MIFFLIN-ST. JEOR
SCORE: 2009.5
SCORE: 2009.5

## 2021-06-16 NOTE — PROGRESS NOTES
HPI: I am consulted in this 36 y.o. female who has been experiencing some problems with abdominal pain. she has noted this for about few month(s). It has been occurring about 1-2 times per week. It is moderate.  It is precipitated by diet. It is associated with nausea or vomiting.   It is not associated with chills or fevers. Here for consult about options for treatment.    Allergies:Patient has no known allergies.    Past Medical History:   Diagnosis Date     Hypertension        Past Surgical History:   Procedure Laterality Date     NO PAST SURGERIES         CURRENT MEDS:      History reviewed. No pertinent family history.     reports that she has never smoked. She has never used smokeless tobacco. She reports previous alcohol use.    Review of Systems - Pertinent items are noted in HPI.  A 12 point comprehensive review of systems was negative except as noted.    /78 (Patient Site: Right Arm, Patient Position: Sitting, Cuff Size: Adult Regular)   Wt (!) 285 lb (129.3 kg)       EXAM:  GENERAL: This is a obese female in no distress.  MOUTH: Mucus membranes moist  SKIN: Grossly intact  EYES: No icterus  CARDIAC: RRR w/out murmur  CHEST/LUNG: Clear  ABDOMEN: Soft, nontender RUQ non-tender, B/S present, Mccarthy's sign negative  BACK: No costovertebral tenderness  NEURO: Alert and oriented, nonfocal  PSYCHIATRIC: normal affect      LABS:  No results found for: WBC, HGB, HCT, MCV, PLT  INR/Prothrombin Time      No results found for: ALT, AST, GGT, ALKPHOS, BILITOT    IMAGES:     Assessment/Plan:     Pt with signs and symptoms consistent with symptomatic cholelithiasis. I have recommended a laparoscopic cholecystecomy. I discussed with her that we might not be able to do this laparoscopically with low risk of going open. I went over some of the risks of surgery including but not limited to bleeding, infection and bile duct injury and anesthesia.     Stephen Hall MD  [facility] Department of Surgery

## 2021-06-16 NOTE — TELEPHONE ENCOUNTER
Spoke with Chris and went over surgery details with her:    We've received instruction to get you scheduled for Laparoscopic Cholecystectomy with Dr Stephen Hall. We have that arranged as follows:     Surgery Date: Friday May 14th  Location: [facility] [address], [address] (3rd floor) [place] [employer]  Arrival Time: 9:00 AM (Unless instructed differently by the pre-op call nurse)     Prep Instructions:     1. Please schedule a pre-op physical with your primary care doctor. This may be virtual or face-to-face depending on your doctors preference.     2. PCR-Rated COVID19 testing is required within 4 days of surgery. Please call [phone] to schedule your test at any [facility] clinic location. If your test is positive, your surgery will be canceled.     3. Nothing to eat or drink for 8 hours before surgery unless instructed differently by the pre-op nurse.    4. No blood thinners including aspirin for one week prior to surgery. Verify this is safe for you with your primary care doctor before stopping.     5. You will need an adult to drive you home and stay with you 24 hours after surgery.     6. At this time, no visitors are allowed at the facility; your  will not be able to wait for you.     7. When you arrive to the surgery center, you will be screened for COVID19 symptoms. If you screen positive, your surgery will need to be postponed for your safety.    8. If the community sees a new surge in COVID19 hospital admissions, your procedure may need to be postponed. We will contact you if this happens.    9. We always encourage you to notify your insurance any time you have something scheduled including surgery. The number is usually right on the back of your insurance card. Please call [facility] Cost of Care at [phone] for the surgeon fees, and [facility] Cost of Care [phone] for facility fees if you need pricing information.     Call our office  if you have any questions! Thank you!     [place] SELF  Surgery Scheduler  [facility]  Surgery [facility]  Direct line: [phone]   Main Line: [phone]  Direct Fax: [phone]

## 2021-06-21 ENCOUNTER — COMMUNICATION - HEALTHEAST (OUTPATIENT)
Dept: SURGERY | Facility: CLINIC | Age: 36
End: 2021-06-21

## 2021-06-26 NOTE — ANESTHESIA POSTPROCEDURE EVALUATION
Patient: Chris Santo  Procedure(s):  CHOLECYSTECTOMY, LAPAROSCOPIC  Anesthesia type: general    Patient location: Phase II Recovery  Last vitals:   Vitals Value Taken Time   /84 06/03/21 1200   Temp 36.3  C (97.3  F) 06/03/21 1130   Pulse 78 06/03/21 1238   Resp 16 06/03/21 1200   SpO2 97 % 06/03/21 1238   Vitals shown include unvalidated device data.  Post vital signs: stable  Level of consciousness: awake and responds to simple questions  Post-anesthesia pain: pain controlled  Post-anesthesia nausea and vomiting: no  Pulmonary: unassisted, return to baseline  Cardiovascular: stable and blood pressure at baseline  Hydration: adequate  Anesthetic events: no    QCDR Measures:  ASA# 11 - Jannie-op Cardiac Arrest: ASA11B - Patient did NOT experience unanticipated cardiac arrest  ASA# 12 - Jannie-op Mortality Rate: ASA12B - Patient did NOT die  ASA# 13 - PACU Re-Intubation Rate: ASA13B - Patient did NOT require a new airway mgmt  ASA# 10 - Composite Anes Safety: ASA10A - No serious adverse event    Additional Notes:

## 2021-06-26 NOTE — ANESTHESIA CARE TRANSFER NOTE
Last vitals:   Vitals:    06/03/21 1130   BP: (!) 153/94   Pulse: 78   Resp: 16   Temp: 36.3  C (97.3  F)   SpO2: 97%     Patient's level of consciousness is drowsy  Spontaneous respirations: yes  Maintains airway independently: yes  Dentition unchanged: yes  Oropharynx: oropharynx clear of all foreign objects    QCDR Measures:  ASA# 20 - Surgical Safety Checklist: WHO surgical safety checklist completed prior to induction    PQRS# 430 - Adult PONV Prevention: 4558F - Pt received => 2 anti-emetic agents (different classes) preop & intraop  ASA# 8 - Peds PONV Prevention: NA - Not pediatric patient, not GA or 2 or more risk factors NOT present  PQRS# 424 - Jannie-op Temp Management: 4559F - At least one body temp DOCUMENTED => 35.5C or 95.9F within required timeframe  PQRS# 426 - PACU Transfer Protocol: - Transfer of care checklist used  ASA# 14 - Acute Post-op Pain: ASA14B - Patient did NOT experience pain >= 7 out of 10  
yes

## 2021-06-26 NOTE — ANESTHESIA PREPROCEDURE EVALUATION
Anesthesia Evaluation      No history of anesthetic complications     Airway   Mallampati: I  Neck ROM: full   Pulmonary     breath sounds clear to auscultation  (-) not a smoker                         Cardiovascular   Exercise tolerance: > or = 4 METS  (+) hypertension, ,     Rhythm: regular        Neuro/Psych - negative ROS     Endo/Other    (+) obesity (BMI 46),   (-) no diabetes, arthritis     GI/Hepatic/Renal    (+) GERD well controlled,       Comments: Symptomatic cholelithiasis s/f lap camron     Other findings: + covid 5/10/21 - asymptomatic, now considered cleared for surgery      Dental - normal exam                        Anesthesia Plan  Planned anesthetic: general endotracheal  GETA, succinylcholine  Decadron 10, Zofran 4  Toradol 15mg at case closure if cleared with surgeon  Ketamine 50mg  ASA 3   Induction: intravenous   Anesthetic plan and risks discussed with: patient  Anesthesia plan special considerations: antiemetics,   Post-op plan: routine recovery

## 2021-07-06 VITALS
HEIGHT: 66 IN | BODY MASS INDEX: 46 KG/M2 | BODY MASS INDEX: 46 KG/M2 | BODY MASS INDEX: 46 KG/M2 | HEIGHT: 66 IN | BODY MASS INDEX: 46 KG/M2 | BODY MASS INDEX: 46 KG/M2 | WEIGHT: 285 LBS | BODY MASS INDEX: 46 KG/M2 | HEIGHT: 66 IN | WEIGHT: 285 LBS | WEIGHT: 285 LBS

## 2022-01-12 VITALS — HEIGHT: 66 IN | WEIGHT: 285 LBS | BODY MASS INDEX: 45.8 KG/M2

## 2022-01-18 VITALS — DIASTOLIC BLOOD PRESSURE: 78 MMHG | SYSTOLIC BLOOD PRESSURE: 136 MMHG | BODY MASS INDEX: 46 KG/M2 | WEIGHT: 285 LBS

## 2022-04-15 LAB
ABO (EXTERNAL): NORMAL
HEPATITIS B SURFACE ANTIGEN (EXTERNAL): NEGATIVE
HIV1+2 AB SERPL QL IA: NONREACTIVE
RH (EXTERNAL): NEGATIVE
RUBELLA ANTIBODY IGG (EXTERNAL): NORMAL

## 2022-05-11 ENCOUNTER — TRANSCRIBE ORDERS (OUTPATIENT)
Dept: MATERNAL FETAL MEDICINE | Facility: CLINIC | Age: 37
End: 2022-05-11
Payer: COMMERCIAL

## 2022-05-11 DIAGNOSIS — O26.90 PREGNANCY RELATED CONDITION, ANTEPARTUM: Primary | ICD-10-CM

## 2022-05-12 ENCOUNTER — PRE VISIT (OUTPATIENT)
Dept: MATERNAL FETAL MEDICINE | Facility: CLINIC | Age: 37
End: 2022-05-12
Payer: COMMERCIAL

## 2022-05-18 ENCOUNTER — OFFICE VISIT (OUTPATIENT)
Dept: MATERNAL FETAL MEDICINE | Facility: CLINIC | Age: 37
End: 2022-05-18
Attending: NURSE PRACTITIONER
Payer: COMMERCIAL

## 2022-05-18 ENCOUNTER — HOSPITAL ENCOUNTER (OUTPATIENT)
Dept: ULTRASOUND IMAGING | Facility: CLINIC | Age: 37
Discharge: HOME | End: 2022-05-18
Attending: NURSE PRACTITIONER
Payer: COMMERCIAL

## 2022-05-18 DIAGNOSIS — O26.90 PREGNANCY RELATED CONDITION, ANTEPARTUM: ICD-10-CM

## 2022-05-18 DIAGNOSIS — O99.212 OBESITY DURING PREGNANCY IN SECOND TRIMESTER: ICD-10-CM

## 2022-05-18 DIAGNOSIS — Z36.2 ENCOUNTER FOR FOLLOW-UP ULTRASOUND OF FETAL ANATOMY: Primary | ICD-10-CM

## 2022-05-18 DIAGNOSIS — O09.522 ADVANCED MATERNAL AGE IN MULTIGRAVIDA, SECOND TRIMESTER: ICD-10-CM

## 2022-05-18 PROCEDURE — 76811 OB US DETAILED SNGL FETUS: CPT | Mod: 26 | Performed by: OBSTETRICS & GYNECOLOGY

## 2022-05-18 PROCEDURE — 76811 OB US DETAILED SNGL FETUS: CPT

## 2022-05-18 NOTE — PROGRESS NOTES
Please see the imaging tab for details of the ultrasound performed today.    Jessi Hart MD  Specialist in Maternal-Fetal Medicine

## 2022-06-08 ENCOUNTER — HOSPITAL ENCOUNTER (OUTPATIENT)
Dept: ULTRASOUND IMAGING | Facility: CLINIC | Age: 37
Discharge: HOME | End: 2022-06-08
Attending: OBSTETRICS & GYNECOLOGY
Payer: COMMERCIAL

## 2022-06-08 ENCOUNTER — OFFICE VISIT (OUTPATIENT)
Dept: MATERNAL FETAL MEDICINE | Facility: CLINIC | Age: 37
End: 2022-06-08
Attending: OBSTETRICS & GYNECOLOGY
Payer: COMMERCIAL

## 2022-06-08 DIAGNOSIS — Z36.2 ENCOUNTER FOR FOLLOW-UP ULTRASOUND OF FETAL ANATOMY: ICD-10-CM

## 2022-06-08 DIAGNOSIS — O35.9XX0 FETAL ABNORMALITY AFFECTING MANAGEMENT OF MOTHER, ANTEPARTUM, SINGLE OR UNSPECIFIED FETUS: Primary | ICD-10-CM

## 2022-06-08 PROCEDURE — 76816 OB US FOLLOW-UP PER FETUS: CPT | Mod: 26 | Performed by: OBSTETRICS & GYNECOLOGY

## 2022-06-08 PROCEDURE — 76816 OB US FOLLOW-UP PER FETUS: CPT

## 2022-08-23 ENCOUNTER — LAB REQUISITION (OUTPATIENT)
Dept: LAB | Facility: CLINIC | Age: 37
End: 2022-08-23

## 2022-08-23 DIAGNOSIS — Z3A.35 35 WEEKS GESTATION OF PREGNANCY: ICD-10-CM

## 2022-08-23 LAB — GROUP B STREPTOCOCCUS (EXTERNAL): NEGATIVE

## 2022-08-23 PROCEDURE — 87081 CULTURE SCREEN ONLY: CPT | Performed by: NURSE PRACTITIONER

## 2022-08-27 LAB — BACTERIA SPEC CULT: NORMAL

## 2022-09-06 ENCOUNTER — LAB REQUISITION (OUTPATIENT)
Dept: LAB | Facility: CLINIC | Age: 37
End: 2022-09-06

## 2022-09-06 DIAGNOSIS — R03.0 ELEVATED BLOOD-PRESSURE READING, WITHOUT DIAGNOSIS OF HYPERTENSION: ICD-10-CM

## 2022-09-06 LAB
ALBUMIN MFR UR ELPH: 11.8 MG/DL
ALT SERPL W P-5'-P-CCNC: 17 U/L (ref 10–35)
AST SERPL W P-5'-P-CCNC: 20 U/L (ref 10–35)
BASOPHILS # BLD AUTO: 0 10E3/UL (ref 0–0.2)
BASOPHILS NFR BLD AUTO: 0 %
CREAT SERPL-MCNC: 0.4 MG/DL (ref 0.51–0.95)
CREAT UR-MCNC: 51.7 MG/DL
EOSINOPHIL # BLD AUTO: 0.1 10E3/UL (ref 0–0.7)
EOSINOPHIL NFR BLD AUTO: 1 %
ERYTHROCYTE [DISTWIDTH] IN BLOOD BY AUTOMATED COUNT: 14.8 % (ref 10–15)
GFR SERPL CREATININE-BSD FRML MDRD: >90 ML/MIN/1.73M2
HCT VFR BLD AUTO: 39.5 % (ref 35–47)
HGB BLD-MCNC: 12.4 G/DL (ref 11.7–15.7)
HOLD SPECIMEN: NORMAL
IMM GRANULOCYTES # BLD: 0.2 10E3/UL
IMM GRANULOCYTES NFR BLD: 2 %
LYMPHOCYTES # BLD AUTO: 1.6 10E3/UL (ref 0.8–5.3)
LYMPHOCYTES NFR BLD AUTO: 16 %
MCH RBC QN AUTO: 29.3 PG (ref 26.5–33)
MCHC RBC AUTO-ENTMCNC: 31.4 G/DL (ref 31.5–36.5)
MCV RBC AUTO: 93 FL (ref 78–100)
MONOCYTES # BLD AUTO: 1 10E3/UL (ref 0–1.3)
MONOCYTES NFR BLD AUTO: 10 %
NEUTROPHILS # BLD AUTO: 7.2 10E3/UL (ref 1.6–8.3)
NEUTROPHILS NFR BLD AUTO: 71 %
NRBC # BLD AUTO: 0 10E3/UL
NRBC BLD AUTO-RTO: 0 /100
PLATELET # BLD AUTO: 218 10E3/UL (ref 150–450)
PROT/CREAT 24H UR: 0.23 MG/MG CR (ref 0–0.2)
RBC # BLD AUTO: 4.23 10E6/UL (ref 3.8–5.2)
WBC # BLD AUTO: 10.1 10E3/UL (ref 4–11)

## 2022-09-06 PROCEDURE — 84460 ALANINE AMINO (ALT) (SGPT): CPT | Performed by: OBSTETRICS & GYNECOLOGY

## 2022-09-06 PROCEDURE — 85004 AUTOMATED DIFF WBC COUNT: CPT | Performed by: OBSTETRICS & GYNECOLOGY

## 2022-09-06 PROCEDURE — 84156 ASSAY OF PROTEIN URINE: CPT | Performed by: OBSTETRICS & GYNECOLOGY

## 2022-09-06 PROCEDURE — 84450 TRANSFERASE (AST) (SGOT): CPT | Performed by: OBSTETRICS & GYNECOLOGY

## 2022-09-06 PROCEDURE — 82565 ASSAY OF CREATININE: CPT | Performed by: OBSTETRICS & GYNECOLOGY

## 2022-09-22 ENCOUNTER — HOSPITAL ENCOUNTER (INPATIENT)
Facility: CLINIC | Age: 37
LOS: 2 days | Discharge: HOME HEALTH | End: 2022-09-24
Attending: OBSTETRICS & GYNECOLOGY | Admitting: OBSTETRICS & GYNECOLOGY
Payer: COMMERCIAL

## 2022-09-22 ENCOUNTER — ANESTHESIA EVENT (OUTPATIENT)
Dept: OBGYN | Facility: CLINIC | Age: 37
End: 2022-09-22
Payer: COMMERCIAL

## 2022-09-22 ENCOUNTER — ANESTHESIA (OUTPATIENT)
Dept: OBGYN | Facility: CLINIC | Age: 37
End: 2022-09-22
Payer: COMMERCIAL

## 2022-09-22 DIAGNOSIS — O14.10 SEVERE PRE-ECLAMPSIA, ANTEPARTUM: ICD-10-CM

## 2022-09-22 LAB
ABO/RH(D): NORMAL
ANTIBODY SCREEN: NEGATIVE
HGB BLD-MCNC: 12 G/DL (ref 11.7–15.7)
SARS-COV-2 RNA RESP QL NAA+PROBE: NEGATIVE
SPECIMEN EXPIRATION DATE: NORMAL
T PALLIDUM AB SER QL: NONREACTIVE

## 2022-09-22 PROCEDURE — 86901 BLOOD TYPING SEROLOGIC RH(D): CPT | Performed by: OBSTETRICS & GYNECOLOGY

## 2022-09-22 PROCEDURE — 3E033VJ INTRODUCTION OF OTHER HORMONE INTO PERIPHERAL VEIN, PERCUTANEOUS APPROACH: ICD-10-PCS | Performed by: OBSTETRICS & GYNECOLOGY

## 2022-09-22 PROCEDURE — 722N000001 HC LABOR CARE VAGINAL DELIVERY SINGLE

## 2022-09-22 PROCEDURE — 00HU33Z INSERTION OF INFUSION DEVICE INTO SPINAL CANAL, PERCUTANEOUS APPROACH: ICD-10-PCS | Performed by: ANESTHESIOLOGY

## 2022-09-22 PROCEDURE — 3E0R3BZ INTRODUCTION OF ANESTHETIC AGENT INTO SPINAL CANAL, PERCUTANEOUS APPROACH: ICD-10-PCS | Performed by: ANESTHESIOLOGY

## 2022-09-22 PROCEDURE — 370N000003 HC ANESTHESIA WARD SERVICE

## 2022-09-22 PROCEDURE — 250N000011 HC RX IP 250 OP 636

## 2022-09-22 PROCEDURE — 250N000011 HC RX IP 250 OP 636: Performed by: ANESTHESIOLOGY

## 2022-09-22 PROCEDURE — 258N000003 HC RX IP 258 OP 636: Performed by: ANESTHESIOLOGY

## 2022-09-22 PROCEDURE — 0UQMXZZ REPAIR VULVA, EXTERNAL APPROACH: ICD-10-PCS | Performed by: OBSTETRICS & GYNECOLOGY

## 2022-09-22 PROCEDURE — 36415 COLL VENOUS BLD VENIPUNCTURE: CPT | Performed by: OBSTETRICS & GYNECOLOGY

## 2022-09-22 PROCEDURE — 258N000003 HC RX IP 258 OP 636: Performed by: OBSTETRICS & GYNECOLOGY

## 2022-09-22 PROCEDURE — 250N000009 HC RX 250: Performed by: OBSTETRICS & GYNECOLOGY

## 2022-09-22 PROCEDURE — 85018 HEMOGLOBIN: CPT | Performed by: OBSTETRICS & GYNECOLOGY

## 2022-09-22 PROCEDURE — U0003 INFECTIOUS AGENT DETECTION BY NUCLEIC ACID (DNA OR RNA); SEVERE ACUTE RESPIRATORY SYNDROME CORONAVIRUS 2 (SARS-COV-2) (CORONAVIRUS DISEASE [COVID-19]), AMPLIFIED PROBE TECHNIQUE, MAKING USE OF HIGH THROUGHPUT TECHNOLOGIES AS DESCRIBED BY CMS-2020-01-R: HCPCS | Performed by: OBSTETRICS & GYNECOLOGY

## 2022-09-22 PROCEDURE — 120N000001 HC R&B MED SURG/OB

## 2022-09-22 PROCEDURE — 10907ZC DRAINAGE OF AMNIOTIC FLUID, THERAPEUTIC FROM PRODUCTS OF CONCEPTION, VIA NATURAL OR ARTIFICIAL OPENING: ICD-10-PCS | Performed by: OBSTETRICS & GYNECOLOGY

## 2022-09-22 PROCEDURE — 86780 TREPONEMA PALLIDUM: CPT | Performed by: OBSTETRICS & GYNECOLOGY

## 2022-09-22 RX ORDER — OXYTOCIN/0.9 % SODIUM CHLORIDE 30/500 ML
1-24 PLASTIC BAG, INJECTION (ML) INTRAVENOUS CONTINUOUS
Status: DISCONTINUED | OUTPATIENT
Start: 2022-09-22 | End: 2022-09-22 | Stop reason: HOSPADM

## 2022-09-22 RX ORDER — ACETAMINOPHEN 325 MG/1
650 TABLET ORAL EVERY 4 HOURS PRN
Status: DISCONTINUED | OUTPATIENT
Start: 2022-09-22 | End: 2022-09-24 | Stop reason: HOSPADM

## 2022-09-22 RX ORDER — MISOPROSTOL 200 UG/1
800 TABLET ORAL
Status: DISCONTINUED | OUTPATIENT
Start: 2022-09-22 | End: 2022-09-24 | Stop reason: HOSPADM

## 2022-09-22 RX ORDER — MISOPROSTOL 200 UG/1
400 TABLET ORAL
Status: DISCONTINUED | OUTPATIENT
Start: 2022-09-22 | End: 2022-09-22 | Stop reason: HOSPADM

## 2022-09-22 RX ORDER — CITRIC ACID/SODIUM CITRATE 334-500MG
30 SOLUTION, ORAL ORAL
Status: DISCONTINUED | OUTPATIENT
Start: 2022-09-22 | End: 2022-09-22 | Stop reason: HOSPADM

## 2022-09-22 RX ORDER — ONDANSETRON 2 MG/ML
4 INJECTION INTRAMUSCULAR; INTRAVENOUS EVERY 6 HOURS PRN
Status: DISCONTINUED | OUTPATIENT
Start: 2022-09-22 | End: 2022-09-22 | Stop reason: HOSPADM

## 2022-09-22 RX ORDER — PROCHLORPERAZINE MALEATE 10 MG
10 TABLET ORAL EVERY 6 HOURS PRN
Status: DISCONTINUED | OUTPATIENT
Start: 2022-09-22 | End: 2022-09-22 | Stop reason: HOSPADM

## 2022-09-22 RX ORDER — FENTANYL CITRATE 50 UG/ML
50 INJECTION, SOLUTION INTRAMUSCULAR; INTRAVENOUS EVERY 30 MIN PRN
Status: DISCONTINUED | OUTPATIENT
Start: 2022-09-22 | End: 2022-09-22 | Stop reason: HOSPADM

## 2022-09-22 RX ORDER — MISOPROSTOL 200 UG/1
800 TABLET ORAL
Status: DISCONTINUED | OUTPATIENT
Start: 2022-09-22 | End: 2022-09-22 | Stop reason: HOSPADM

## 2022-09-22 RX ORDER — TRANEXAMIC ACID 10 MG/ML
1 INJECTION, SOLUTION INTRAVENOUS EVERY 30 MIN PRN
Status: DISCONTINUED | OUTPATIENT
Start: 2022-09-22 | End: 2022-09-22 | Stop reason: HOSPADM

## 2022-09-22 RX ORDER — NALBUPHINE HYDROCHLORIDE 10 MG/ML
2.5-5 INJECTION, SOLUTION INTRAMUSCULAR; INTRAVENOUS; SUBCUTANEOUS EVERY 6 HOURS PRN
Status: DISCONTINUED | OUTPATIENT
Start: 2022-09-22 | End: 2022-09-23

## 2022-09-22 RX ORDER — PROCHLORPERAZINE 25 MG
25 SUPPOSITORY, RECTAL RECTAL EVERY 12 HOURS PRN
Status: DISCONTINUED | OUTPATIENT
Start: 2022-09-22 | End: 2022-09-22 | Stop reason: HOSPADM

## 2022-09-22 RX ORDER — CARBOPROST TROMETHAMINE 250 UG/ML
250 INJECTION, SOLUTION INTRAMUSCULAR
Status: DISCONTINUED | OUTPATIENT
Start: 2022-09-22 | End: 2022-09-22 | Stop reason: HOSPADM

## 2022-09-22 RX ORDER — LABETALOL HYDROCHLORIDE 5 MG/ML
20-40 INJECTION, SOLUTION INTRAVENOUS EVERY 10 MIN PRN
Status: DISCONTINUED | OUTPATIENT
Start: 2022-09-22 | End: 2022-09-23

## 2022-09-22 RX ORDER — KETOROLAC TROMETHAMINE 30 MG/ML
30 INJECTION, SOLUTION INTRAMUSCULAR; INTRAVENOUS
Status: DISCONTINUED | OUTPATIENT
Start: 2022-09-22 | End: 2022-09-23

## 2022-09-22 RX ORDER — OXYTOCIN/0.9 % SODIUM CHLORIDE 30/500 ML
340 PLASTIC BAG, INJECTION (ML) INTRAVENOUS CONTINUOUS PRN
Status: DISCONTINUED | OUTPATIENT
Start: 2022-09-22 | End: 2022-09-22 | Stop reason: HOSPADM

## 2022-09-22 RX ORDER — HYDROCORTISONE 25 MG/G
CREAM TOPICAL 3 TIMES DAILY PRN
Status: DISCONTINUED | OUTPATIENT
Start: 2022-09-22 | End: 2022-09-24 | Stop reason: HOSPADM

## 2022-09-22 RX ORDER — MAGNESIUM SULFATE HEPTAHYDRATE 500 MG/ML
10 INJECTION, SOLUTION INTRAMUSCULAR; INTRAVENOUS
Status: DISCONTINUED | OUTPATIENT
Start: 2022-09-22 | End: 2022-09-23

## 2022-09-22 RX ORDER — MODIFIED LANOLIN
OINTMENT (GRAM) TOPICAL
Status: DISCONTINUED | OUTPATIENT
Start: 2022-09-22 | End: 2022-09-24 | Stop reason: HOSPADM

## 2022-09-22 RX ORDER — OXYTOCIN/0.9 % SODIUM CHLORIDE 30/500 ML
100-340 PLASTIC BAG, INJECTION (ML) INTRAVENOUS CONTINUOUS PRN
Status: DISCONTINUED | OUTPATIENT
Start: 2022-09-22 | End: 2022-09-23

## 2022-09-22 RX ORDER — SODIUM CHLORIDE, SODIUM LACTATE, POTASSIUM CHLORIDE, CALCIUM CHLORIDE 600; 310; 30; 20 MG/100ML; MG/100ML; MG/100ML; MG/100ML
10-125 INJECTION, SOLUTION INTRAVENOUS CONTINUOUS
Status: DISCONTINUED | OUTPATIENT
Start: 2022-09-22 | End: 2022-09-24 | Stop reason: HOSPADM

## 2022-09-22 RX ORDER — BUPIVACAINE HYDROCHLORIDE 2.5 MG/ML
INJECTION, SOLUTION EPIDURAL; INFILTRATION; INTRACAUDAL
Status: COMPLETED | OUTPATIENT
Start: 2022-09-22 | End: 2022-09-22

## 2022-09-22 RX ORDER — MISOPROSTOL 200 UG/1
400 TABLET ORAL
Status: DISCONTINUED | OUTPATIENT
Start: 2022-09-22 | End: 2022-09-24 | Stop reason: HOSPADM

## 2022-09-22 RX ORDER — METOCLOPRAMIDE 10 MG/1
10 TABLET ORAL EVERY 6 HOURS PRN
Status: DISCONTINUED | OUTPATIENT
Start: 2022-09-22 | End: 2022-09-22 | Stop reason: HOSPADM

## 2022-09-22 RX ORDER — METHYLERGONOVINE MALEATE 0.2 MG/ML
200 INJECTION INTRAVENOUS
Status: DISCONTINUED | OUTPATIENT
Start: 2022-09-22 | End: 2022-09-22 | Stop reason: HOSPADM

## 2022-09-22 RX ORDER — NALOXONE HYDROCHLORIDE 0.4 MG/ML
0.4 INJECTION, SOLUTION INTRAMUSCULAR; INTRAVENOUS; SUBCUTANEOUS
Status: DISCONTINUED | OUTPATIENT
Start: 2022-09-22 | End: 2022-09-22 | Stop reason: HOSPADM

## 2022-09-22 RX ORDER — HYDRALAZINE HYDROCHLORIDE 20 MG/ML
INJECTION INTRAMUSCULAR; INTRAVENOUS
Status: COMPLETED
Start: 2022-09-22 | End: 2022-09-22

## 2022-09-22 RX ORDER — MAGNESIUM SULFATE HEPTAHYDRATE 40 MG/ML
4 INJECTION, SOLUTION INTRAVENOUS
Status: DISCONTINUED | OUTPATIENT
Start: 2022-09-22 | End: 2022-09-23

## 2022-09-22 RX ORDER — OXYTOCIN 10 [USP'U]/ML
10 INJECTION, SOLUTION INTRAMUSCULAR; INTRAVENOUS
Status: DISCONTINUED | OUTPATIENT
Start: 2022-09-22 | End: 2022-09-22 | Stop reason: HOSPADM

## 2022-09-22 RX ORDER — LIDOCAINE 40 MG/G
CREAM TOPICAL
Status: DISCONTINUED | OUTPATIENT
Start: 2022-09-22 | End: 2022-09-22 | Stop reason: HOSPADM

## 2022-09-22 RX ORDER — METHYLERGONOVINE MALEATE 0.2 MG/ML
200 INJECTION INTRAVENOUS
Status: DISCONTINUED | OUTPATIENT
Start: 2022-09-22 | End: 2022-09-24 | Stop reason: HOSPADM

## 2022-09-22 RX ORDER — OXYTOCIN 10 [USP'U]/ML
10 INJECTION, SOLUTION INTRAMUSCULAR; INTRAVENOUS
Status: DISCONTINUED | OUTPATIENT
Start: 2022-09-22 | End: 2022-09-24 | Stop reason: HOSPADM

## 2022-09-22 RX ORDER — CARBOPROST TROMETHAMINE 250 UG/ML
250 INJECTION, SOLUTION INTRAMUSCULAR
Status: DISCONTINUED | OUTPATIENT
Start: 2022-09-22 | End: 2022-09-24 | Stop reason: HOSPADM

## 2022-09-22 RX ORDER — IBUPROFEN 800 MG/1
800 TABLET, FILM COATED ORAL
Status: DISCONTINUED | OUTPATIENT
Start: 2022-09-22 | End: 2022-09-22

## 2022-09-22 RX ORDER — ONDANSETRON 4 MG/1
4 TABLET, ORALLY DISINTEGRATING ORAL EVERY 6 HOURS PRN
Status: DISCONTINUED | OUTPATIENT
Start: 2022-09-22 | End: 2022-09-22 | Stop reason: HOSPADM

## 2022-09-22 RX ORDER — ONDANSETRON 4 MG/1
4 TABLET, ORALLY DISINTEGRATING ORAL EVERY 6 HOURS PRN
Status: DISCONTINUED | OUTPATIENT
Start: 2022-09-22 | End: 2022-09-22

## 2022-09-22 RX ORDER — DOCUSATE SODIUM 100 MG/1
100 CAPSULE, LIQUID FILLED ORAL DAILY
Status: DISCONTINUED | OUTPATIENT
Start: 2022-09-23 | End: 2022-09-24 | Stop reason: HOSPADM

## 2022-09-22 RX ORDER — LORAZEPAM 2 MG/ML
2 INJECTION INTRAMUSCULAR
Status: DISCONTINUED | OUTPATIENT
Start: 2022-09-22 | End: 2022-09-23

## 2022-09-22 RX ORDER — NALOXONE HYDROCHLORIDE 0.4 MG/ML
0.2 INJECTION, SOLUTION INTRAMUSCULAR; INTRAVENOUS; SUBCUTANEOUS
Status: DISCONTINUED | OUTPATIENT
Start: 2022-09-22 | End: 2022-09-22 | Stop reason: HOSPADM

## 2022-09-22 RX ORDER — ONDANSETRON 2 MG/ML
4 INJECTION INTRAMUSCULAR; INTRAVENOUS EVERY 6 HOURS PRN
Status: DISCONTINUED | OUTPATIENT
Start: 2022-09-22 | End: 2022-09-22

## 2022-09-22 RX ORDER — OXYTOCIN/0.9 % SODIUM CHLORIDE 30/500 ML
340 PLASTIC BAG, INJECTION (ML) INTRAVENOUS CONTINUOUS PRN
Status: DISCONTINUED | OUTPATIENT
Start: 2022-09-22 | End: 2022-09-24 | Stop reason: HOSPADM

## 2022-09-22 RX ORDER — HYDRALAZINE HYDROCHLORIDE 20 MG/ML
5-10 INJECTION INTRAMUSCULAR; INTRAVENOUS
Status: DISCONTINUED | OUTPATIENT
Start: 2022-09-22 | End: 2022-09-23

## 2022-09-22 RX ORDER — EPHEDRINE SULFATE 50 MG/ML
5 INJECTION, SOLUTION INTRAMUSCULAR; INTRAVENOUS; SUBCUTANEOUS
Status: DISCONTINUED | OUTPATIENT
Start: 2022-09-22 | End: 2022-09-22 | Stop reason: HOSPADM

## 2022-09-22 RX ORDER — BISACODYL 10 MG
10 SUPPOSITORY, RECTAL RECTAL DAILY PRN
Status: DISCONTINUED | OUTPATIENT
Start: 2022-09-22 | End: 2022-09-24 | Stop reason: HOSPADM

## 2022-09-22 RX ORDER — MAGNESIUM SULFATE HEPTAHYDRATE 40 MG/ML
2 INJECTION, SOLUTION INTRAVENOUS
Status: DISCONTINUED | OUTPATIENT
Start: 2022-09-22 | End: 2022-09-23

## 2022-09-22 RX ORDER — IBUPROFEN 800 MG/1
800 TABLET, FILM COATED ORAL EVERY 6 HOURS PRN
Status: DISCONTINUED | OUTPATIENT
Start: 2022-09-22 | End: 2022-09-24 | Stop reason: HOSPADM

## 2022-09-22 RX ORDER — SODIUM CHLORIDE, SODIUM LACTATE, POTASSIUM CHLORIDE, CALCIUM CHLORIDE 600; 310; 30; 20 MG/100ML; MG/100ML; MG/100ML; MG/100ML
INJECTION, SOLUTION INTRAVENOUS CONTINUOUS PRN
Status: DISCONTINUED | OUTPATIENT
Start: 2022-09-22 | End: 2022-09-22 | Stop reason: HOSPADM

## 2022-09-22 RX ORDER — TRANEXAMIC ACID 10 MG/ML
1 INJECTION, SOLUTION INTRAVENOUS EVERY 30 MIN PRN
Status: DISCONTINUED | OUTPATIENT
Start: 2022-09-22 | End: 2022-09-24 | Stop reason: HOSPADM

## 2022-09-22 RX ORDER — METOCLOPRAMIDE HYDROCHLORIDE 5 MG/ML
10 INJECTION INTRAMUSCULAR; INTRAVENOUS EVERY 6 HOURS PRN
Status: DISCONTINUED | OUTPATIENT
Start: 2022-09-22 | End: 2022-09-22 | Stop reason: HOSPADM

## 2022-09-22 RX ORDER — OXYTOCIN 10 [USP'U]/ML
10 INJECTION, SOLUTION INTRAMUSCULAR; INTRAVENOUS
Status: DISCONTINUED | OUTPATIENT
Start: 2022-09-22 | End: 2022-09-23

## 2022-09-22 RX ADMIN — BUPIVACAINE HYDROCHLORIDE 8 ML: 2.5 INJECTION, SOLUTION EPIDURAL; INFILTRATION; INTRACAUDAL at 18:23

## 2022-09-22 RX ADMIN — SODIUM CHLORIDE, POTASSIUM CHLORIDE, SODIUM LACTATE AND CALCIUM CHLORIDE 1000 ML: 600; 310; 30; 20 INJECTION, SOLUTION INTRAVENOUS at 18:16

## 2022-09-22 RX ADMIN — HYDRALAZINE HYDROCHLORIDE 10 MG: 20 INJECTION INTRAMUSCULAR; INTRAVENOUS at 23:04

## 2022-09-22 RX ADMIN — SODIUM CHLORIDE, POTASSIUM CHLORIDE, SODIUM LACTATE AND CALCIUM CHLORIDE: 600; 310; 30; 20 INJECTION, SOLUTION INTRAVENOUS at 09:54

## 2022-09-22 RX ADMIN — Medication 2 MILLI-UNITS/MIN: at 09:54

## 2022-09-22 RX ADMIN — Medication: at 18:33

## 2022-09-22 RX ADMIN — SODIUM CHLORIDE, POTASSIUM CHLORIDE, SODIUM LACTATE AND CALCIUM CHLORIDE: 600; 310; 30; 20 INJECTION, SOLUTION INTRAVENOUS at 15:59

## 2022-09-22 ASSESSMENT — ACTIVITIES OF DAILY LIVING (ADL)
ADLS_ACUITY_SCORE: 20
CONCENTRATING,_REMEMBERING_OR_MAKING_DECISIONS_DIFFICULTY: NO
ADLS_ACUITY_SCORE: 20
ADLS_ACUITY_SCORE: 20
CHANGE_IN_FUNCTIONAL_STATUS_SINCE_ONSET_OF_CURRENT_ILLNESS/INJURY: NO
WALKING_OR_CLIMBING_STAIRS_DIFFICULTY: NO
DOING_ERRANDS_INDEPENDENTLY_DIFFICULTY: NO
DRESSING/BATHING_DIFFICULTY: NO
FALL_HISTORY_WITHIN_LAST_SIX_MONTHS: NO
ADLS_ACUITY_SCORE: 20
ADLS_ACUITY_SCORE: 20
DIFFICULTY_COMMUNICATING: NO
DIFFICULTY_EATING/SWALLOWING: NO
ADLS_ACUITY_SCORE: 20
ADLS_ACUITY_SCORE: 20
HEARING_DIFFICULTY_OR_DEAF: NO
TOILETING_ISSUES: NO
WEAR_GLASSES_OR_BLIND: NO
ADLS_ACUITY_SCORE: 20

## 2022-09-22 NOTE — PROVIDER NOTIFICATION
09/22/22 1235   Provider Notification   Provider Name/Title Dr Weinberg   Method of Notification At Bedside   Dr Weinberg rounding at bedside. SVE unchanged; unable to perform AROM after pelvic tilt, fundal pressure etc. Pt tolerated well. Will continue with Pitocin IV administration and continuous fetal and uterine monitoring, attempt AROM at later time. Pt verbalizes understanding of and agrees with plan.

## 2022-09-22 NOTE — H&P
OB Brief Admit H&P    No significant change in general health status based on examination of the patient, review of Nursing Admission Database and prenatal record.    Pt is a 37 year old  @ 40w1d who presented to L&D for IOL secondary to gestational HTN.    Patient's prenatal course has been complicated by:  1. Morbid obesity - BMI 44 at NOB, TWG. Early , 28 week 162 (normal 3hr)  2. Gestational HTN - mildly elevated BP's this week  3. Hx shoulder dystocia last delivery (9lb4oz, 30 seconds)  4. AMA  5. Rh negative      Prenatal Labs:    Blood type B negative  Rubella immune    (3hr = 90/177/137/63)  GBS negative    EFW: 9.5lb    LMP 12/15/2021   EFM:  Baseline 135, moderate variability, + accels, no decels, Cat I  [place]: irregular/rare  SVE: 2/50%/-3, posterior  Membranes:  intact    Assessment:  37 year old  @ 40w1d admitted for IOL secondary to gestational HTN    Plan:  1. Admit to labor and delivery   2. BP's in 138-148/70's. Will monitor closeline. No s/sx preE.  3. IOL: Unable to AROM, Pitocin started, will AROm when able  4. Hx shoulder dystocia - discussed increased risk of recurrence, possibly more severe  5. S/p TDAP this pregnancy. Declines COVID and Flu vaccines.     Elizabeth Weinberg MD  2022  8:47 AM

## 2022-09-22 NOTE — ANESTHESIA PREPROCEDURE EVALUATION
Anesthesia Pre-Procedure Evaluation    Patient: Chris Santo   MRN: [id number] : 1985        Procedure :           Past Medical History:   Diagnosis Date     Hypertension       Past Surgical History:   Procedure Laterality Date     NO PAST SURGERIES       ZZC LAP,CHOLECYSTECTOMY/EXPLORE N/A 6/3/2021    Procedure: CHOLECYSTECTOMY, LAPAROSCOPIC;  Surgeon: Stephen Hall MD;  Location: [facility];  Service: General      No Known Allergies   Social History     Tobacco Use     Smoking status: Never Smoker     Smokeless tobacco: Never Used   Substance Use Topics     Alcohol use: Not Currently      Wt Readings from Last 1 Encounters:   22 (!) 154.2 kg (340 lb)        Anesthesia Evaluation   Pt has had prior anesthetic. Type: OB Labor Epidural.    No history of anesthetic complications       ROS/MED HX  ENT/Pulmonary:       Neurologic:  - neg neurologic ROS     Cardiovascular:     (+) --PIH ---    METS/Exercise Tolerance:     Hematologic:     (+) no anticoagulation therapy, no thrombocytopenia,  (-) anemia   Musculoskeletal:       GI/Hepatic:       Renal/Genitourinary:       Endo:     (+) Obesity,     Psychiatric/Substance Use:       Infectious Disease:       Malignancy:       Other:    @ 40w1d who presented to L&D for IOL secondary to gestational HTN.     Patient's prenatal course has been complicated by:  1. Morbid obesity - BMI 55  2. Gestational HTN - mildly elevated BP's this week  3. Hx shoulder dystocia last delivery (9lb4oz, 30 seconds)  4. AMA  5. Rh negative            OUTSIDE LABS:  CBC:   Lab Results   Component Value Date    WBC 10.1 2022    WBC 12.4 (H) 03/15/2019    HGB 12.0 2022    HGB 12.4 2022    HCT 39.5 2022    HCT 33.8 (L) 03/15/2019     2022     03/15/2019     BMP:   Lab Results   Component Value Date    CR 0.40 (L) 2022    CR 0.49 (L) 03/15/2019     COAGS: No results found for: PTT, INR, FIBR  POC: No results found  for: BGM, HCG, HCGS  HEPATIC:   Lab Results   Component Value Date    ALT 17 09/06/2022    AST 20 09/06/2022     OTHER: No results found for: PH, LACT, A1C, RAUL, PHOS, MAG, LIPASE, AMYLASE, TSH, T4, T3, CRP, SED    Anesthesia Plan    ASA Status:  3      Anesthesia Type: Epidural.              Consents    Anesthesia Plan(s) and associated risks, benefits, and realistic alternatives discussed. Questions answered and patient/representative(s) expressed understanding.    - Discussed:     - Discussed with:  Patient         Postoperative Care            Comments:    Other Comments: Continuous Labor Epidural: Indication is for labor pain. Following an discussion of the procedure, epidural expectations, and risks and benefits (risks including, but not limited to, nerve damage, infection, bleeding/hematoma, inadvertent dural puncture, spinal headache, partial or failed block), the patient appears to understand and consents to proceed. Questions were encouraged and answered.             Merna Calvin MD

## 2022-09-22 NOTE — ANESTHESIA PROCEDURE NOTES
Epidural catheter Procedure Note    Pre-Procedure   Staff -        Anesthesiologist:  Merna Calvin MD       Performed By: anesthesiologist       Referred By: Lenard       Location: OB       Pre-Anesthestic Checklist: patient identified, IV checked, risks and benefits discussed, informed consent, monitors and equipment checked, pre-op evaluation and at physician/surgeon's request  Timeout:       Correct Patient: Yes        Correct Procedure: Yes        Correct Site: Yes        Correct Position: Yes   Procedure Documentation  Procedure: epidural catheter       Diagnosis: Labor pain       Patient Position: sitting       Skin prep: Chloraprep       Local skin infiltrated with 2 mL of 1% lidocaine.        Insertion Site: L3-4. (midline approach).       Technique: LORT saline        BOY at 6 cm.       Needle Type: Touhy needle       Needle Gauge: 17.        Needle Length (Inches): 3.5        Catheter: 19 G.          Catheter threaded easily.         6 cm epidural space.         Threaded 12 cm at skin.         # of attempts: 1 and  # of redirects:  0    Assessment/Narrative         Paresthesias: No.       Test dose of 3 mL lidocaine 1.5% w/ 1:200,000 epinephrine at 18:20 CDT.         Test dose negative, 3 minutes after injection, for signs of intravascular, subdural, or intrathecal injection.       Insertion/Infusion Method: LORT saline       Aspiration negative for Heme or CSF via Epidural Catheter.    Medication(s) Administered   0.25% Bupivacaine PF (Epidural) - EPIDURAL   8 mL - 9/22/2022 6:23:00 PM   Comments:  Procedure tolerated well without apparent complications. Initial MDA bolus of 0.25% bupivacaine was incrementally given without difficulty or complications. Epidural infusion (0.125% bupi with fentanyl 2mcg/ml) started at 10 ml/hr with PCEA of 5 ml q15min with a max cumulative dose of 25 ml/hr. PCEA instructions given and use encouraged PRN. Epidural expectations again reviewed and questions  answered. Patient hemodynamically stable.  Patient and epidural functionality to be reassessed later via vital sign flowsheet, nursing communication, and/or patient report.  Anesthesiologist immediately available for ongoing assessment and management.

## 2022-09-22 NOTE — PROVIDER NOTIFICATION
09/22/22 1705   Provider Notification   Provider Name/Title Dr Weinberg   Method of Notification At Bedside   SVE per Dr Weinberg. Able to attempt AROM with pelvic tilt. No fluid return. Pt states all attempts at AROM with previous pregnancies were unsuccessful. Cervical change noted. Will continue with birthing ball and Pitocin.

## 2022-09-23 LAB
ALT SERPL W P-5'-P-CCNC: 16 U/L (ref 10–35)
AST SERPL W P-5'-P-CCNC: 27 U/L (ref 10–35)
CREAT SERPL-MCNC: 0.4 MG/DL (ref 0.51–0.95)
ERYTHROCYTE [DISTWIDTH] IN BLOOD BY AUTOMATED COUNT: 14.5 % (ref 10–15)
GFR SERPL CREATININE-BSD FRML MDRD: >90 ML/MIN/1.73M2
HCT VFR BLD AUTO: 38.9 % (ref 35–47)
HGB BLD-MCNC: 11 G/DL (ref 11.7–15.7)
HGB BLD-MCNC: 12.3 G/DL (ref 11.7–15.7)
MCH RBC QN AUTO: 28.8 PG (ref 26.5–33)
MCHC RBC AUTO-ENTMCNC: 31.6 G/DL (ref 31.5–36.5)
MCV RBC AUTO: 91 FL (ref 78–100)
PLATELET # BLD AUTO: 215 10E3/UL (ref 150–450)
RBC # BLD AUTO: 4.27 10E6/UL (ref 3.8–5.2)
WBC # BLD AUTO: 12.7 10E3/UL (ref 4–11)

## 2022-09-23 PROCEDURE — 82565 ASSAY OF CREATININE: CPT | Performed by: OBSTETRICS & GYNECOLOGY

## 2022-09-23 PROCEDURE — 250N000011 HC RX IP 250 OP 636: Performed by: OBSTETRICS & GYNECOLOGY

## 2022-09-23 PROCEDURE — 250N000013 HC RX MED GY IP 250 OP 250 PS 637: Performed by: OBSTETRICS & GYNECOLOGY

## 2022-09-23 PROCEDURE — 84460 ALANINE AMINO (ALT) (SGPT): CPT | Performed by: OBSTETRICS & GYNECOLOGY

## 2022-09-23 PROCEDURE — 85027 COMPLETE CBC AUTOMATED: CPT | Performed by: OBSTETRICS & GYNECOLOGY

## 2022-09-23 PROCEDURE — 120N000001 HC R&B MED SURG/OB

## 2022-09-23 PROCEDURE — 36415 COLL VENOUS BLD VENIPUNCTURE: CPT | Performed by: OBSTETRICS & GYNECOLOGY

## 2022-09-23 PROCEDURE — 999N000079 HC STATISTIC IP LACTATION SERVICES 1-15 MIN

## 2022-09-23 PROCEDURE — 84450 TRANSFERASE (AST) (SGOT): CPT | Performed by: OBSTETRICS & GYNECOLOGY

## 2022-09-23 PROCEDURE — 85018 HEMOGLOBIN: CPT | Performed by: OBSTETRICS & GYNECOLOGY

## 2022-09-23 PROCEDURE — 258N000003 HC RX IP 258 OP 636: Performed by: OBSTETRICS & GYNECOLOGY

## 2022-09-23 RX ORDER — LABETALOL HYDROCHLORIDE 5 MG/ML
20-80 INJECTION, SOLUTION INTRAVENOUS EVERY 10 MIN PRN
Status: DISCONTINUED | OUTPATIENT
Start: 2022-09-23 | End: 2022-09-24 | Stop reason: HOSPADM

## 2022-09-23 RX ORDER — SODIUM CHLORIDE, SODIUM LACTATE, POTASSIUM CHLORIDE, CALCIUM CHLORIDE 600; 310; 30; 20 MG/100ML; MG/100ML; MG/100ML; MG/100ML
10-125 INJECTION, SOLUTION INTRAVENOUS CONTINUOUS
Status: DISCONTINUED | OUTPATIENT
Start: 2022-09-23 | End: 2022-09-24 | Stop reason: HOSPADM

## 2022-09-23 RX ORDER — MAGNESIUM SULFATE IN WATER 40 MG/ML
2 INJECTION, SOLUTION INTRAVENOUS CONTINUOUS
Status: DISCONTINUED | OUTPATIENT
Start: 2022-09-23 | End: 2022-09-24 | Stop reason: HOSPADM

## 2022-09-23 RX ORDER — MAGNESIUM SULFATE HEPTAHYDRATE 40 MG/ML
4 INJECTION, SOLUTION INTRAVENOUS
Status: DISCONTINUED | OUTPATIENT
Start: 2022-09-23 | End: 2022-09-24 | Stop reason: HOSPADM

## 2022-09-23 RX ORDER — CALCIUM GLUCONATE 94 MG/ML
1 INJECTION, SOLUTION INTRAVENOUS
Status: DISCONTINUED | OUTPATIENT
Start: 2022-09-23 | End: 2022-09-24 | Stop reason: HOSPADM

## 2022-09-23 RX ORDER — MAGNESIUM SULFATE HEPTAHYDRATE 40 MG/ML
4 INJECTION, SOLUTION INTRAVENOUS ONCE
Status: COMPLETED | OUTPATIENT
Start: 2022-09-23 | End: 2022-09-23

## 2022-09-23 RX ORDER — HYDRALAZINE HYDROCHLORIDE 20 MG/ML
10 INJECTION INTRAMUSCULAR; INTRAVENOUS
Status: DISCONTINUED | OUTPATIENT
Start: 2022-09-23 | End: 2022-09-24 | Stop reason: HOSPADM

## 2022-09-23 RX ORDER — LORAZEPAM 2 MG/ML
2 INJECTION INTRAMUSCULAR
Status: DISCONTINUED | OUTPATIENT
Start: 2022-09-23 | End: 2022-09-24 | Stop reason: HOSPADM

## 2022-09-23 RX ORDER — MAGNESIUM SULFATE HEPTAHYDRATE 40 MG/ML
2 INJECTION, SOLUTION INTRAVENOUS
Status: DISCONTINUED | OUTPATIENT
Start: 2022-09-23 | End: 2022-09-24 | Stop reason: HOSPADM

## 2022-09-23 RX ORDER — MAGNESIUM SULFATE HEPTAHYDRATE 500 MG/ML
10 INJECTION, SOLUTION INTRAMUSCULAR; INTRAVENOUS
Status: DISCONTINUED | OUTPATIENT
Start: 2022-09-23 | End: 2022-09-24 | Stop reason: HOSPADM

## 2022-09-23 RX ADMIN — IBUPROFEN 800 MG: 800 TABLET, FILM COATED ORAL at 14:21

## 2022-09-23 RX ADMIN — ACETAMINOPHEN 650 MG: 325 TABLET, FILM COATED ORAL at 23:00

## 2022-09-23 RX ADMIN — ACETAMINOPHEN 650 MG: 325 TABLET, FILM COATED ORAL at 14:21

## 2022-09-23 RX ADMIN — ACETAMINOPHEN 650 MG: 325 TABLET, FILM COATED ORAL at 08:20

## 2022-09-23 RX ADMIN — SODIUM CHLORIDE, POTASSIUM CHLORIDE, SODIUM LACTATE AND CALCIUM CHLORIDE 75 ML/HR: 600; 310; 30; 20 INJECTION, SOLUTION INTRAVENOUS at 13:07

## 2022-09-23 RX ADMIN — ACETAMINOPHEN 650 MG: 325 TABLET, FILM COATED ORAL at 02:23

## 2022-09-23 RX ADMIN — DOCUSATE SODIUM 100 MG: 100 CAPSULE, LIQUID FILLED ORAL at 08:21

## 2022-09-23 RX ADMIN — IBUPROFEN 800 MG: 800 TABLET, FILM COATED ORAL at 08:20

## 2022-09-23 RX ADMIN — LABETALOL HYDROCHLORIDE 20 MG: 5 INJECTION, SOLUTION INTRAVENOUS at 00:05

## 2022-09-23 RX ADMIN — MAGNESIUM SULFATE HEPTAHYDRATE 2 G/HR: 40 INJECTION, SOLUTION INTRAVENOUS at 20:43

## 2022-09-23 RX ADMIN — SODIUM CHLORIDE, POTASSIUM CHLORIDE, SODIUM LACTATE AND CALCIUM CHLORIDE 10 ML/HR: 600; 310; 30; 20 INJECTION, SOLUTION INTRAVENOUS at 00:34

## 2022-09-23 RX ADMIN — IBUPROFEN 800 MG: 800 TABLET, FILM COATED ORAL at 20:49

## 2022-09-23 RX ADMIN — MAGNESIUM SULFATE HEPTAHYDRATE 2 G/HR: 40 INJECTION, SOLUTION INTRAVENOUS at 01:06

## 2022-09-23 RX ADMIN — MAGNESIUM SULFATE HEPTAHYDRATE 4 G: 40 INJECTION, SOLUTION INTRAVENOUS at 00:33

## 2022-09-23 RX ADMIN — IBUPROFEN 800 MG: 800 TABLET, FILM COATED ORAL at 01:00

## 2022-09-23 RX ADMIN — ACETAMINOPHEN 650 MG: 325 TABLET, FILM COATED ORAL at 19:09

## 2022-09-23 ASSESSMENT — ACTIVITIES OF DAILY LIVING (ADL)
ADLS_ACUITY_SCORE: 20

## 2022-09-23 NOTE — PLAN OF CARE
Data: Chris Santo transferred to [facility] via wheelchair at 0200. Baby transferred via crib.  Action: Receiving unit notified of transfer: Yes. Patient and family notified of room change. Belongings sent to receiving unit. Accompanied by Registered Nurse. Oriented patient to surroundings. Call light within reach. ID bands double-checked with receiving RN.  Response: Patient tolerated transfer and is stable.    Patients mobililty level scored using the bedside mobility assistance tool (BMAT). Patient is at a mobility level test number: 4. Mobility equipment used: none required. Required assist of 0 staff members. Further use of BMAT scoring not required.     Pt was able to ambulate to bathroom and void.

## 2022-09-23 NOTE — PLAN OF CARE
VSS, Denies headache, epigastric pain, visual disturbances. Edema unchanged this shift. Bonding well with baby. Pain is well controlled with tylenol and ibuprofen. Patient is voiding without difficulty and ambulating independently. Tolerating regular diet well. Independent in self and baby cares. Breast feeding is going well.

## 2022-09-23 NOTE — PROVIDER NOTIFICATION
09/22/22 2259   Provider Notification   Provider Name/Title Dr. Weinberg   Method of Notification In Department   Notification Reason Maternal Vital Sign Change       2 severe range pressures, MD VORB 10mg hydralazine IV

## 2022-09-23 NOTE — PROGRESS NOTES
Post-partum Note      S: Patient is doing well today.  Pain is controlled with PO medications.  Tolerating regular diet without nausea or vomiting.  Ambulating without dizziness.  Urinating without difficulty. Lochia normal.  On magnesium, still doesn't feel like self, but overall feeling slightly better.     O:   Patient Vitals for the past 24 hrs:   BP Temp Temp src Pulse Resp SpO2   09/23/22 0450 116/66 98.1  F (36.7  C) Oral -- 20 97 %   09/23/22 0200 137/73 -- -- -- -- 97 %   09/23/22 0130 (!) 145/70 -- -- -- -- 97 %   09/23/22 0125 -- -- -- -- -- 97 %   09/23/22 0120 -- -- -- -- -- 98 %   09/23/22 0115 -- -- -- -- -- 97 %   09/23/22 0110 (!) 145/72 -- -- -- -- 97 %   09/23/22 0105 -- -- -- -- -- 96 %   09/23/22 0100 -- -- -- -- -- 97 %   09/23/22 0055 (!) 147/75 -- -- -- -- 95 %   09/23/22 0052 (!) 146/72 -- -- -- -- --   09/23/22 0051 -- -- -- -- -- 95 %   09/23/22 0047 (!) 151/82 -- -- -- -- --   09/23/22 0046 -- -- -- -- -- 96 %   09/23/22 0043 -- 98  F (36.7  C) Oral -- 20 --   09/23/22 0042 (!) 153/83 -- -- -- -- --   09/23/22 0041 -- -- -- -- -- 98 %   09/23/22 0037 (!) 142/79 -- -- -- -- --   09/23/22 0036 -- -- -- -- -- 96 %   09/23/22 0033 135/80 -- -- -- -- --   09/23/22 0022 (!) 142/66 -- -- -- -- --   09/23/22 0017 (!) 142/72 -- -- -- -- --   09/23/22 0013 (!) 142/72 -- -- -- -- --   09/23/22 0005 (!) 178/85 -- -- -- -- --   09/22/22 2350 (!) 180/83 -- -- -- -- --   09/22/22 2336 (!) 168/80 -- -- -- -- --   09/22/22 2321 (!) 170/86 -- -- -- -- --   09/22/22 2252 (!) 179/84 -- -- -- -- --   09/22/22 2250 (!) 164/80 -- -- -- -- --   09/22/22 2235 (!) 148/76 97.4  F (36.3  C) Oral -- -- --   09/22/22 2220 139/72 -- -- -- -- --   09/22/22 2200 -- 98.3  F (36.8  C) Oral -- 18 --   09/22/22 2125 (!) 156/72 -- -- -- -- --   09/22/22 2120 (!) 164/74 -- -- -- -- --   09/22/22 2058 -- 97.6  F (36.4  C) Oral -- 18 --   09/22/22 2050 (!) 143/68 -- -- -- -- --   09/22/22 2020 (!) 147/70 -- -- -- -- --    09/22/22 2000 -- -- -- -- -- 94 %   09/22/22 1955 -- -- -- -- -- (!) 87 %   09/22/22 1950 (!) 143/85 97.4  F (36.3  C) Oral -- -- (!) 88 %   09/22/22 1945 -- -- -- -- -- 97 %   09/22/22 1940 -- -- -- -- -- 96 %   09/22/22 1935 -- -- -- -- -- 94 %   09/22/22 1930 -- -- -- -- -- 94 %   09/22/22 1925 -- -- -- -- -- 95 %   09/22/22 1920 -- -- -- -- -- 96 %   09/22/22 1915 130/64 -- -- -- -- 94 %   09/22/22 1910 (!) 141/65 -- -- -- -- 96 %   09/22/22 1905 138/66 -- -- -- -- 96 %   09/22/22 1900 130/64 -- -- -- -- 95 %   09/22/22 1855 128/61 -- -- -- -- 96 %   09/22/22 1851 -- 97.7  F (36.5  C) Oral -- -- --   09/22/22 1850 135/60 97.7  F (36.5  C) Oral 86 -- 96 %   09/22/22 1843 129/69 -- -- 93 -- 94 %   09/22/22 1841 133/70 -- -- 95 -- --   09/22/22 1839 131/69 -- -- 93 -- 94 %   09/22/22 1837 130/69 -- -- 92 -- 94 %   09/22/22 1835 138/70 -- -- 92 -- 94 %   09/22/22 1833 134/65 -- -- 91 -- 95 %   09/22/22 1831 (!) 148/78 -- -- -- -- --   09/22/22 1829 (!) 154/84 -- -- -- -- --   09/22/22 1828 -- -- -- -- -- 95 %   09/22/22 1827 (!) 141/86 -- -- 109 -- 95 %   09/22/22 1823 (!) 171/81 -- -- 117 -- 97 %   09/22/22 1821 (!) 170/80 -- -- 115 -- --   09/22/22 1818 -- -- -- -- -- 97 %   09/22/22 1813 -- -- -- -- -- 97 %   09/22/22 1710 (!) 149/91 98.3  F (36.8  C) Oral -- 20 --   09/22/22 1500 (!) 143/77 97.8  F (36.6  C) Oral -- -- --   09/22/22 1315 -- 98.6  F (37  C) Oral -- -- --   09/22/22 1114 138/76 99  F (37.2  C) Oral -- -- --   09/22/22 0848 (!) 148/72 97.7  F (36.5  C) Oral -- -- --     Gen:  Resting comfortably, NAD  Pulm:  Breathing comfortably on room air  Abd:  Soft, appropriately ttp, non-distended.Fundus firm, obese  Ext:  non-tender, 1+ bilateral LE edema, 1+ reflexes, 1 beat clonus bilaterally     I/O last 3 completed shifts:  In: 1114 [P.O.:550; I.V.:564]  Out: 1619 [Urine:1450; Blood:169]    Hgb:     Hemoglobin   Date Value Ref Range Status   09/23/2022 11.0 (L) 11.7 - 15.7 g/dL Final   09/23/2022 12.3  11.7 - 15.7 g/dL Final   2019 9.1 (L) 11.7 - 15.7 g/dL Final   03/15/2019 10.4 (L) 11.7 - 15.7 g/dL Final       Assessment/Plan:  37 year old  on PPD #1 s/p  after IOL for gHTN, now severe pre-eclampsia on magnesium (started PP).right labial lac with repair   Pregnancy complicated by  1. Morbid obesity - BMI 44 at NOB, TWG. Early , 28 week 162 (normal 3hr)  2. Gestational HTN - mildly elevated BP's this week  3. Hx shoulder dystocia last delivery (9lb4oz, 30 seconds)  4. AMA  5. Rh negative    1. Continue with routine postpartum management  2. Pain well controlled   3. EBL: 169ml ; pre hemoglobin 12, post hemogobin 11, no symptoms of anemia.   4. Baby RH negative, no rhogam indicated  Lab Results   Component Value Date    ABO B 2019    RH Neg 2019    , Rubella immune  5. Feed: Breastfeeding  6. CV/RESP:   -gHTN: severes immediately pp, had rpt labs drawn that were normal. Has been on magnesium for sz ppx. NO symptoms. BP labile since then.   7. DVT PPX: ambulation    Dispo: Anticipate DC home PPD2 for BP monitoring     MD Paulette Mayo OB/GY[facility]  2022, 8:20 AM

## 2022-09-23 NOTE — ANESTHESIA POSTPROCEDURE EVALUATION
Patient: Chris Santo    Procedure: * No procedures listed *       Anesthesia Type:  Epidural    Note:  Disposition: Inpatient   Postop Pain Control:            Sign Out: Well controlled pain   PONV: No   Neuro/Psych: Uneventful            Sign Out: Acceptable/Baseline neuro status   Airway/Respiratory: Uneventful            Sign Out: Acceptable/Baseline resp. status   CV/Hemodynamics: Uneventful            Sign Out: Acceptable CV status; No obvious hypovolemia; No obvious fluid overload   Other NRE: NONE   DID A NON-ROUTINE EVENT OCCUR? No    Event details/Postop Comments:      S/P epidural for labor.   I or my partner was immediately available for management of this patient during epidural analgesia infusion.  VSS.  Doing well. Block resolved.  Neuro at baseline. Denies positional headache. Minimal side effects easily managed w/ PRN meds. No apparent anesthetic complications. No follow-up required.    ERNESTO James MD             Last vitals:  Vitals:    09/23/22 0130 09/23/22 0200 09/23/22 0450   BP: (!) 145/70 137/73 116/66   Pulse:      Resp:   20   Temp:   98.1  F (36.7  C)   SpO2: 97% 97% 97%       Electronically Signed By: Darrick James MD  September 23, 2022  10:27 AM

## 2022-09-23 NOTE — PROVIDER NOTIFICATION
09/22/22 2107   Provider Notification   Provider Name/Title Dr. Weinberg   Method of Notification Electronic Page;Phone   Request Evaluate - Remote   Notification Reason Status Update;CORTES MEYERS returned page, CORTES 8-8.5/90/-1 cervix stretches past dilation, still -1 station, pt stated with her previous births it took longest to dilate and the descent did not take long, she also only pushed 10 minutes. MD will leave in about 20 minutes.

## 2022-09-23 NOTE — PROVIDER NOTIFICATION
09/22/22 1950   Vital Signs   Oximeter Heart Rate 96 bpm   BP (!) 143/85   BP Location Left arm  (forearm)       BP cuff on left forearm as it is difficult to get an accurate reading on upper arm, IV in right wrist

## 2022-09-23 NOTE — PROVIDER NOTIFICATION
09/22/22 2142   Provider Notification   Provider Name/Title Dr. Weinberg   Method of Notification Phone   Request Evaluate - Remote   Notification Reason Status Update       MD called unit to update she was at [facility] is the back up OB. One severe range BP flowed into the chart but the cuff was around the pt's wrist, adjusted cuff and recheck was elevated but not severe.    [___ stools per day] : [unfilled]  stools per day [___ voids per day] : [unfilled] voids per day [Sippy cup use] : Sippy cup use [Bottle in bed] : Bottle in bed [No] : No cigarette smoke exposure [Smoke Detectors] : Smoke detectors [Carbon Monoxide Detectors] : Carbon monoxide detectors [Up to date] : Up to date [Exposure to electronic nicotine delivery system] : No exposure to electronic nicotine delivery system [de-identified] : Picky but eating from all food groups. Stopped BF last week.  Drinks Milk 7 oz 3 times a day, juice 4 oz, water. [FreeTextEntry3] : Sleeps 11p-11a.  Naps sometimes.

## 2022-09-23 NOTE — PROVIDER NOTIFICATION
"   09/22/22 2033   Provider Notification   Provider Name/Title Dr. Weinberg   Method of Notification Electronic Page   Request Evaluate - Remote   Notification Reason Status Update;SVE     \"SVE at 1950 6/70/-2 very stretchy cervix, mec stained fluid, pit @16 mu/hr, ctxs q3-4 mins, starting to have earlys on tracing\" text page sent     MD called unit, RN wanted MD to be aware of status update and meconium fluid. Pit is at 16mu/hr and contractions are every 3 minutes mostly but can space occasionally to 4-5 minutes, RN uncomfortable going much higher on the pitocin without an IUPC as contractions are intermittently difficult to trace and being on the higher end of pitocin.     MD updated, will call with any updates.       "

## 2022-09-23 NOTE — L&D DELIVERY NOTE
OB Delivery Summary      HISTORY OF PRESENT ILLNESS:   with  IUP @ 40w1d who presented for IOL secondary to gestational HTN.  Her prenatal course was  complicated by:   1. Morbid obesity - BMI 44 at NOB, TWG. Early , 28 week 162 (normal 3hr)  2. Gestational HTN - mildly elevated BP's this week  3. Hx shoulder dystocia last delivery (9lb4oz, 30 seconds)  4. AMA  5. Rh negative    Prenatal labs were significant for blood type B negative, rubella status immune.  Glucose challenge test 162 (normal 3hr).  Group beta strep culture was negative.  Estimated fetal weight on admission was approximately 9.5lb.        FIRST STAGE OF LABOR:  The patient was admitted to Labor and Delivery with a fetal heart rate tracing that was category I. Cervix was 2/50/-3, posterior. Pitocin was started for induction. At 5pm, her cervix was 4cm and amniotomy was performed, though no fluid was returned. She gradually progressed in labor and received an epidural for pain control at her request. She became completely dilated at 9:45pm. Her blood pressures remained mildly elevated throughout labor.      SECOND STAGE OF LABOR:  The patient began pushing at 10:25pm from the +2 station.  She gradually brought the vertex down in the birth canal and delivered a viable male infant at 10:30pm on 22.  After delivery of the head, the anterior shoulder and body delivered without difficulty.  No nuchal cord was noted. The infant was placed on the mother's chest.  Delayed cord clamping was performed.        THIRD STAGE OF LABOR:  The cord was clamped and cut. The placenta then delivered spontaneously and appeared intact with a 3-vessel cord at 10:35pm.  Pitocin was started and fundal massage was performed.  Perineum was inspected and a small right labial laceration was noted. It was repaired with a figure of eight suture of 4-0 Vicryl. Estimated blood loss for the delivery was 200cc.       Both mother and baby tolerated delivery well and  there were no complications. Fetal weight was 10lb and Apgars were 9 and 9 at 1 and 5 minutes, respectively.     Immediately after delivery, she had 2 severe range blood pressures and IV hydralazine was given.      Elizabeth Weinberg MD  9/22/2022  10:53 PM

## 2022-09-23 NOTE — PROVIDER NOTIFICATION
09/22/22 2155   Provider Notification   Provider Name/Title Dr. Weinberg   Method of Notification Phone   Notification Reason SVE       Pt complete +1, Dr Weinberg on her way will be here in about 15 mins.

## 2022-09-23 NOTE — LACTATION NOTE
Lactation visit. Patient states she has nursed her other children without any concerns. This baby less than 12 hours old. Brought to the breast. No latch obtain as baby is sleepy. Encouraged STS. Educated on first 24 hour and beyond feeding behaviors, and basic breastfeeding information. Knows to attempt every 2-3 hours and can call for assistance.

## 2022-09-23 NOTE — PLAN OF CARE
"Data: Vital signs within normal limits. Patient continues to be on IV Magnesium. Will need to pass onto the next RN to call MD later what time to discontinue IV Magnesium tonight. IV Magnesium started this am at 0100. Reflexes normal, no clonus. Patient continues to feel \"foggy\" . Patient is getting up to the bathroom per self. I's and O's are being recorded and patient is having an adequate amt. Pain is under control by using po Tylenol, Ibuprofen, and heat over abdomen for cramping. Postpartum checks within normal limits - see flow record. Patient eating and drinking normally. Patient able to empty bladder independently and is up ambulating to bathroom. No apparent signs of infection. Labial laceration healing well, patient is using TUCKS, ice, and benedicto bottle. Patient performing self cares and is able to care for infant.  Action: Patient medicated during the shift for pain and cramping. See MAR. Patient reassessed within 1 hour after each medication and pain was improved - patient stated she was comfortable.   Response: Positive attachment behaviors observed with infant.  has been present.   Plan: Anticipate discharge on 9/24/22  "

## 2022-09-23 NOTE — PROVIDER NOTIFICATION
09/23/22 0005   Provider Notification   Provider Name/Title Dr. Weinberg   Method of Notification In Department   Request Evaluate in Person   Notification Reason Maternal Vital Sign Change   Discussed with MD that BP remains severe range post hydralazine 10mg IV dose, most recent /83.     MD ordering prn IV labetalol, Mg gtt, and PIH labs.     Updated primary RNKathrine.

## 2022-09-24 VITALS
HEIGHT: 66 IN | OXYGEN SATURATION: 97 % | TEMPERATURE: 98 F | WEIGHT: 293 LBS | DIASTOLIC BLOOD PRESSURE: 78 MMHG | RESPIRATION RATE: 20 BRPM | SYSTOLIC BLOOD PRESSURE: 138 MMHG | HEART RATE: 86 BPM | BODY MASS INDEX: 47.09 KG/M2

## 2022-09-24 PROBLEM — Z36.89 ENCOUNTER FOR TRIAGE IN PREGNANT PATIENT: Status: RESOLVED | Noted: 2019-03-14 | Resolved: 2022-09-24

## 2022-09-24 PROBLEM — E66.813 CLASS 3 SEVERE OBESITY DUE TO EXCESS CALORIES IN ADULT (H): Status: ACTIVE | Noted: 2022-09-24

## 2022-09-24 PROBLEM — E66.01 CLASS 3 SEVERE OBESITY DUE TO EXCESS CALORIES IN ADULT (H): Status: ACTIVE | Noted: 2022-09-24

## 2022-09-24 PROBLEM — O14.10 PREECLAMPSIA, SEVERE: Status: ACTIVE | Noted: 2022-09-24

## 2022-09-24 LAB
ALT SERPL W P-5'-P-CCNC: 20 U/L (ref 10–35)
ALT SERPL W P-5'-P-CCNC: 33 U/L (ref 10–35)
AST SERPL W P-5'-P-CCNC: 25 U/L (ref 10–35)
AST SERPL W P-5'-P-CCNC: 35 U/L (ref 10–35)
CREAT SERPL-MCNC: 0.49 MG/DL (ref 0.51–0.95)
CREAT SERPL-MCNC: 0.5 MG/DL (ref 0.51–0.95)
ERYTHROCYTE [DISTWIDTH] IN BLOOD BY AUTOMATED COUNT: 14.8 % (ref 10–15)
ERYTHROCYTE [DISTWIDTH] IN BLOOD BY AUTOMATED COUNT: 14.8 % (ref 10–15)
GFR SERPL CREATININE-BSD FRML MDRD: >90 ML/MIN/1.73M2
GFR SERPL CREATININE-BSD FRML MDRD: >90 ML/MIN/1.73M2
HCT VFR BLD AUTO: 33.9 % (ref 35–47)
HCT VFR BLD AUTO: 34.5 % (ref 35–47)
HGB BLD-MCNC: 10.5 G/DL (ref 11.7–15.7)
HGB BLD-MCNC: 10.6 G/DL (ref 11.7–15.7)
MCH RBC QN AUTO: 28.2 PG (ref 26.5–33)
MCH RBC QN AUTO: 28.7 PG (ref 26.5–33)
MCHC RBC AUTO-ENTMCNC: 30.7 G/DL (ref 31.5–36.5)
MCHC RBC AUTO-ENTMCNC: 31 G/DL (ref 31.5–36.5)
MCV RBC AUTO: 92 FL (ref 78–100)
MCV RBC AUTO: 93 FL (ref 78–100)
PLATELET # BLD AUTO: 171 10E3/UL (ref 150–450)
PLATELET # BLD AUTO: 194 10E3/UL (ref 150–450)
RBC # BLD AUTO: 3.66 10E6/UL (ref 3.8–5.2)
RBC # BLD AUTO: 3.76 10E6/UL (ref 3.8–5.2)
WBC # BLD AUTO: 7.7 10E3/UL (ref 4–11)
WBC # BLD AUTO: 8.3 10E3/UL (ref 4–11)

## 2022-09-24 PROCEDURE — 36415 COLL VENOUS BLD VENIPUNCTURE: CPT | Performed by: OBSTETRICS & GYNECOLOGY

## 2022-09-24 PROCEDURE — 85014 HEMATOCRIT: CPT | Performed by: OBSTETRICS & GYNECOLOGY

## 2022-09-24 PROCEDURE — 999N000079 HC STATISTIC IP LACTATION SERVICES 1-15 MIN

## 2022-09-24 PROCEDURE — 250N000013 HC RX MED GY IP 250 OP 250 PS 637: Performed by: OBSTETRICS & GYNECOLOGY

## 2022-09-24 PROCEDURE — 84460 ALANINE AMINO (ALT) (SGPT): CPT | Performed by: OBSTETRICS & GYNECOLOGY

## 2022-09-24 PROCEDURE — 82565 ASSAY OF CREATININE: CPT | Performed by: OBSTETRICS & GYNECOLOGY

## 2022-09-24 PROCEDURE — 85027 COMPLETE CBC AUTOMATED: CPT | Performed by: OBSTETRICS & GYNECOLOGY

## 2022-09-24 PROCEDURE — 84450 TRANSFERASE (AST) (SGOT): CPT | Performed by: OBSTETRICS & GYNECOLOGY

## 2022-09-24 RX ORDER — DOCUSATE SODIUM 100 MG/1
100 CAPSULE, LIQUID FILLED ORAL DAILY
COMMUNITY
Start: 2022-09-25

## 2022-09-24 RX ORDER — FAMOTIDINE 10 MG
10 TABLET ORAL 2 TIMES DAILY
Qty: 60 TABLET | Refills: 0 | Status: SHIPPED | OUTPATIENT
Start: 2022-09-24

## 2022-09-24 RX ORDER — IBUPROFEN 800 MG/1
800 TABLET, FILM COATED ORAL EVERY 6 HOURS PRN
Qty: 60 TABLET | Refills: 0 | Status: SHIPPED | OUTPATIENT
Start: 2022-09-24

## 2022-09-24 RX ORDER — FAMOTIDINE 10 MG
10 TABLET ORAL 2 TIMES DAILY
Status: DISCONTINUED | OUTPATIENT
Start: 2022-09-24 | End: 2022-09-24 | Stop reason: HOSPADM

## 2022-09-24 RX ADMIN — ACETAMINOPHEN 650 MG: 325 TABLET, FILM COATED ORAL at 15:44

## 2022-09-24 RX ADMIN — FAMOTIDINE 10 MG: 10 TABLET ORAL at 11:23

## 2022-09-24 RX ADMIN — ACETAMINOPHEN 650 MG: 325 TABLET, FILM COATED ORAL at 03:03

## 2022-09-24 RX ADMIN — IBUPROFEN 800 MG: 800 TABLET, FILM COATED ORAL at 11:23

## 2022-09-24 RX ADMIN — IBUPROFEN 800 MG: 800 TABLET, FILM COATED ORAL at 03:03

## 2022-09-24 RX ADMIN — DOCUSATE SODIUM 100 MG: 100 CAPSULE, LIQUID FILLED ORAL at 11:22

## 2022-09-24 RX ADMIN — BENZOCAINE: 11.4 AEROSOL, SPRAY TOPICAL at 11:23

## 2022-09-24 RX ADMIN — ACETAMINOPHEN 650 MG: 325 TABLET, FILM COATED ORAL at 07:23

## 2022-09-24 ASSESSMENT — ACTIVITIES OF DAILY LIVING (ADL)
ADLS_ACUITY_SCORE: 20
DEPENDENT_IADLS:: INDEPENDENT
ADLS_ACUITY_SCORE: 20

## 2022-09-24 NOTE — PROVIDER NOTIFICATION
09/23/22 2005   Provider Notification   Provider Name/Title Dr. Queen   Method of Notification Phone   Request Evaluate-Remote   Notification Reason Status Update       MD states plan to stop Magnesium at 24 hours, unless pt has severe range BP's or severe headache. Page provider if BP persistently above 150/100 after Magnesium stopped.

## 2022-09-24 NOTE — PROVIDER NOTIFICATION
09/23/22 1110   Provider Notification   Provider Name/Title Dr. Eng   Method of Notification Phone   Request Evaluate-Remote   Notification Reason Status Update       Notified MD of new upper-right-quadrant epigastric pain, radiating towards back. MD gave VORB/TORB orders for CBC/ALT/AST/Crt lab redraw. Stated to notify if any labs come back abnormal.

## 2022-09-24 NOTE — PLAN OF CARE
"Vital signs stable, blood pressure maintained <150/100. Reports minimal \"fogginess\" feeling, reflexes present but diminished, absent clonus. Pt up independently in the room, recording I&Os as needed. Pt denies HA and visual disturbances. Reports minimal and intermittent nausea with changes in position, and RUQ pain radiating to her back (see previous MD notification note). Fundus firm, midline, 1cm below umbilicus. Lochia rubra and scant to light. Pain managed with Tylenol and Ibuprofen. Generalized and dependent edema present, 2+ upper extremities and 3+ lower extremities. Breastfeeding infant and feeding with formula. Bonding well with infant. FOB at bedside and supportive of and attentive to pt.      UPDATE: Magnesium stopped at 0100 (24 hour joi) for stable BP <150/100 and normal labs. Saline locked. Encouraging pt to call with any questions, concerns, or change in symptoms. Will monitor as needed and continue with plan of care.   "

## 2022-09-24 NOTE — DISCHARGE INSTRUCTIONS
Contact your doctor if you have a temperature >100.4F, if you experience heavy bleeding and are soaking through a pad in less than one hour, if you are not able to eat or drink, or have severe abdominal pain.     Nothing in the vagina for 6 weeks post partum. No intercourse, tampons or douching.   Showers are okay. No soaking in a bath, sitz baths for 15-20 min 2x daily are okay.     Please make a follow up appointment in the office within 1 week.     Please check your blood pressure at least 2 times per day. If over 150 on the top number  on the bottom number, please recheck in 15 minutes and if still elevated, please call the office.   Please call the office with chest pain with shortness of breath or blurry vision with a headache, or upper abdominal pain as this could be concerning for preeclampsia (blood pressure problems with pregnancy and post partum).     Postpartum Vaginal Delivery Instructions    Activity     Ask family and friends for help when you need it.  Do not place anything in your vagina for 6 weeks.  You are not restricted on other activities, but take it easy for a few weeks to allow your body to recover from delivery.  You are able to do any activities you feel up to that point.  No driving until you have stopped taking your pain medications (usually two weeks after delivery).     Call your health care provider if you have any of these symptoms:     Increased pain, swelling, redness, or fluid around your stiches from an episiotomy or perineal tear.  A fever above 100.4 F (38 C) with or without chills when placing a thermometer under your tongue.  You soak a sanitary pad with blood within 1 hour, or you see blood clots larger than a golf ball.  Bleeding that lasts more than 6 weeks.  Vaginal discharge that smells bad.  Severe pain, cramping or tenderness in your lower belly area.  A need to urinate more frequently (use the toilet more often), more urgently (use the toilet very quickly), or  it burns when you urinate.  Nausea and vomiting.  Redness, swelling or pain around a vein in your leg.  Problems breastfeeding or a red or painful area on your breast.  Chest pain and cough or are gasping for air.  Problems coping with sadness, anxiety, or depression.  If you have any concerns about hurting yourself or the baby, call your provider immediately.   You have questions or concerns after you return home.     Keep your hands clean:  Always wash your hands before touching your perineal area and stitches.  This helps reduce your risk of infection.  If your hands aren't dirty, you may use an alcohol hand-rub to clean your hands. Keep your nails clean and short.        Preeclampsia   Call your doctor right away if you have any of the following:  - Edema (swelling) in your face or hands  - Rapid weight gain-about 1 pound or more in a day  - Headache  - Abdominal pain on your right side  - Vision problems (flashes or spots)  - You have questions or concerns once you return home.

## 2022-09-24 NOTE — PROVIDER NOTIFICATION
09/24/22 1701   Provider Notification   Provider Name/Title Dr. Queen   Method of Notification Phone   Request Evaluate-Remote   Notification Reason Lab Results;Status Update   MD notified of patient's recent vital signs. /78 at 1630. Patient denies symptoms of preEclampsia other than the intermittent pain in the right upper quadrant area. Patient continues to have +3 edema in the lower extremities, and a +2 edema in the upper extremities.   Received orders discharge home- patient is to follow up in one week for a check up in the clinic, check blood pressures at home twice a day. Call  for blood pressures 150/100.   MD notified that patient is going home with a breast pump, and a blood pressure cuff. MD is going to send an Rx to her pharmacy on file- Ibuprofen, Tylenol, and Pepcid. Will relay back to patient, and go over paperwork.

## 2022-09-24 NOTE — LACTATION NOTE
Lactation in to follow up. Patient states infant now latching well. Has started to supplement with formula due to baby being hungry after nursing. No questions at this time. Knows lactation available for any questions or concerns.

## 2022-09-24 NOTE — PROGRESS NOTES
Post-partum Note      S: Patient is doing well today.  Pain is controlled with PO medications.  Tolerating regular diet without nausea or vomiting.  Ambulating without dizziness.  Urinating without difficulty. Lochia normal. Feeling better than she was yesterday when on magnesium, still fatigued. Has some RUQ and midline pain. No shortness of breath/headache/vision changes. Baby up a lot overnight     O:   Patient Vitals for the past 24 hrs:   BP Temp Temp src Resp SpO2 Weight   22 0505 137/80 98  F (36.7  C) Oral 18 -- (!) 151 kg (333 lb)   22 0100 (!) 145/80 -- -- 18 -- --   22 2300 (!) 145/80 97.9  F (36.6  C) Oral 18 97 % --   22 1845 (!) 147/97 98.2  F (36.8  C) Oral 18 -- --   22 1415 (!) 136/91 98  F (36.7  C) Oral 20 97 % --     Gen:  Resting comfortably, NAD  Pulm:  Breathing comfortably on room air  Abd:  Soft, appropriately ttp, non-distended.Fundus firm, obese. Midline abdominal pain   Ext:  non-tender, 1+ bilateral LE edema    I/O last 3 completed shifts:  In: 4479.1 [P.O.:3000; I.V.:1479.1]  Out: 2950 [Urine:2950]    Hgb:     Hemoglobin   Date Value Ref Range Status   2022 10.6 (L) 11.7 - 15.7 g/dL Final   2022 11.0 (L) 11.7 - 15.7 g/dL Final   2019 9.1 (L) 11.7 - 15.7 g/dL Final   03/15/2019 10.4 (L) 11.7 - 15.7 g/dL Final       Assessment/Plan:  37 year old  on PPD #2 s/p  after IOL for gHTN, now severe pre-eclampsia on magnesium (started PP).right labial lac with repair   Pregnancy complicated by  1. Morbid obesity - BMI 44 at NOB, TWG. Early , 28 week 162 (normal 3hr)  2. Gestational HTN - mildly elevated BP's this week  3. Hx shoulder dystocia last delivery (9lb4oz, 30 seconds)  4. AMA  5. Rh negative    1. Continue with routine postpartum management  2. Pain well controlled   3. EBL: 169ml ; pre hemoglobin 12.3, post hemogobin 11, no symptoms of anemia.   4. Baby RH negative, no rhogam indicated  Lab Results   Component Value Date     ABO B 03/14/2019    RH Neg 03/14/2019    , Rubella immune  5. Feed: Breastfeeding  6. CV/RESP:   -severe gHTN/preeclampsia: severes immediately pp, had rpt labs drawn that were normal and was started on magnesium. BP labile since then. S/p magnesium x 24 hours post partum  -having some RUQ pain and epigastric pain, will get labs, trial of pepsid.   7. DVT PPX: ambulation    Dispo: Anticipate DC home later today if bp remain stable and labs normal. Will need 1 week post partum BP check     MD Paulette Mayo OB/GYN  9/24/2022  10:34 AM

## 2022-09-24 NOTE — PROGRESS NOTES
Data: Vital signs within normal limits, 130-140s/80's.  Postpartum checks within normal limits - see flow record. Patient eating and drinking normally. Patient able to empty bladder independently and is up ambulating in room. Patient weighs more than 320lbs, so patient seems short of breath at times in the room when she moves around from bathroom to bed, and adjusts herself in bed. However, lungs are clear. No apparent signs of infection. Labial laceration  healing well. Patient performing self cares and is able to care for infant.  Action: Patient medicated during the shift for pain and cramping. See MAR. Patient reassessed within 1 hour after each medication and pain was improved - patient stated she was comfortable. Patient education done about discharge instructions, how to use the home blood pressure cuff. See flow record.  Response: Positive attachment behaviors observed with infant. Family here earlier today. Spouse will accompany home.   Plan: Anticipate discharge on 9/24/22

## 2022-11-01 ENCOUNTER — LAB REQUISITION (OUTPATIENT)
Dept: LAB | Facility: CLINIC | Age: 37
End: 2022-11-01

## 2022-11-01 DIAGNOSIS — Z12.4 ENCOUNTER FOR SCREENING FOR MALIGNANT NEOPLASM OF CERVIX: ICD-10-CM

## 2022-11-01 PROCEDURE — G0145 SCR C/V CYTO,THINLAYER,RESCR: HCPCS | Performed by: OBSTETRICS & GYNECOLOGY

## 2022-11-01 PROCEDURE — 87624 HPV HI-RISK TYP POOLED RSLT: CPT | Performed by: OBSTETRICS & GYNECOLOGY

## 2022-11-07 LAB
BKR LAB AP GYN ADEQUACY: NORMAL
BKR LAB AP GYN INTERPRETATION: NORMAL
BKR LAB AP HPV REFLEX: NORMAL
BKR LAB AP LMP: NORMAL
BKR LAB AP PREVIOUS ABNL DX: NORMAL
BKR LAB AP PREVIOUS ABNORMAL: NORMAL
PATH REPORT.COMMENTS IMP SPEC: NORMAL
PATH REPORT.COMMENTS IMP SPEC: NORMAL
PATH REPORT.RELEVANT HX SPEC: NORMAL

## 2022-11-08 LAB
HUMAN PAPILLOMA VIRUS 16 DNA: NEGATIVE
HUMAN PAPILLOMA VIRUS 18 DNA: NEGATIVE
HUMAN PAPILLOMA VIRUS FINAL DIAGNOSIS: NORMAL
HUMAN PAPILLOMA VIRUS OTHER HR: NEGATIVE

## 2023-11-13 NOTE — PROGRESS NOTES
Please see full imaging report from [employer] program under imaging tab.    Latrice Denton MD  Maternal Fetal Medicine       5

## 2024-05-09 ENCOUNTER — HOSPITAL ENCOUNTER (EMERGENCY)
Facility: CLINIC | Age: 39
Discharge: HOME | End: 2024-05-09
Attending: EMERGENCY MEDICINE | Admitting: EMERGENCY MEDICINE
Payer: COMMERCIAL

## 2024-05-09 VITALS
HEIGHT: 67 IN | SYSTOLIC BLOOD PRESSURE: 144 MMHG | OXYGEN SATURATION: 99 % | HEART RATE: 86 BPM | DIASTOLIC BLOOD PRESSURE: 81 MMHG | WEIGHT: 293 LBS | BODY MASS INDEX: 45.99 KG/M2 | TEMPERATURE: 98.2 F | RESPIRATION RATE: 16 BRPM

## 2024-05-09 DIAGNOSIS — R11.2 NAUSEA AND VOMITING, UNSPECIFIED VOMITING TYPE: ICD-10-CM

## 2024-05-09 DIAGNOSIS — R10.13 EPIGASTRIC PAIN: ICD-10-CM

## 2024-05-09 LAB
ANION GAP SERPL CALCULATED.3IONS-SCNC: 14 MMOL/L (ref 7–15)
BUN SERPL-MCNC: 11.7 MG/DL (ref 6–20)
CALCIUM SERPL-MCNC: 9 MG/DL (ref 8.6–10)
CHLORIDE SERPL-SCNC: 104 MMOL/L (ref 98–107)
CREAT SERPL-MCNC: 0.5 MG/DL (ref 0.51–0.95)
DEPRECATED HCO3 PLAS-SCNC: 22 MMOL/L (ref 22–29)
EGFRCR SERPLBLD CKD-EPI 2021: >90 ML/MIN/1.73M2
ERYTHROCYTE [DISTWIDTH] IN BLOOD BY AUTOMATED COUNT: 13.8 % (ref 10–15)
FLUAV RNA SPEC QL NAA+PROBE: NEGATIVE
FLUBV RNA RESP QL NAA+PROBE: NEGATIVE
GLUCOSE SERPL-MCNC: 96 MG/DL (ref 70–99)
HCG SERPL QL: NEGATIVE
HCT VFR BLD AUTO: 41.2 % (ref 35–47)
HGB BLD-MCNC: 13.5 G/DL (ref 11.7–15.7)
MCH RBC QN AUTO: 27.9 PG (ref 26.5–33)
MCHC RBC AUTO-ENTMCNC: 32.8 G/DL (ref 31.5–36.5)
MCV RBC AUTO: 85 FL (ref 78–100)
PLATELET # BLD AUTO: 322 10E3/UL (ref 150–450)
POTASSIUM SERPL-SCNC: 3.9 MMOL/L (ref 3.4–5.3)
RBC # BLD AUTO: 4.84 10E6/UL (ref 3.8–5.2)
RSV RNA SPEC NAA+PROBE: NEGATIVE
SARS-COV-2 RNA RESP QL NAA+PROBE: NEGATIVE
SODIUM SERPL-SCNC: 140 MMOL/L (ref 135–145)
WBC # BLD AUTO: 13.5 10E3/UL (ref 4–11)

## 2024-05-09 PROCEDURE — 84703 CHORIONIC GONADOTROPIN ASSAY: CPT | Performed by: EMERGENCY MEDICINE

## 2024-05-09 PROCEDURE — 96374 THER/PROPH/DIAG INJ IV PUSH: CPT

## 2024-05-09 PROCEDURE — 80048 BASIC METABOLIC PNL TOTAL CA: CPT | Performed by: EMERGENCY MEDICINE

## 2024-05-09 PROCEDURE — 258N000003 HC RX IP 258 OP 636: Performed by: EMERGENCY MEDICINE

## 2024-05-09 PROCEDURE — 96361 HYDRATE IV INFUSION ADD-ON: CPT

## 2024-05-09 PROCEDURE — 85027 COMPLETE CBC AUTOMATED: CPT | Performed by: EMERGENCY MEDICINE

## 2024-05-09 PROCEDURE — 36415 COLL VENOUS BLD VENIPUNCTURE: CPT | Performed by: EMERGENCY MEDICINE

## 2024-05-09 PROCEDURE — 99284 EMERGENCY DEPT VISIT MOD MDM: CPT | Mod: 25

## 2024-05-09 PROCEDURE — 87637 SARSCOV2&INF A&B&RSV AMP PRB: CPT | Performed by: EMERGENCY MEDICINE

## 2024-05-09 PROCEDURE — 250N000011 HC RX IP 250 OP 636: Performed by: EMERGENCY MEDICINE

## 2024-05-09 RX ORDER — ONDANSETRON 4 MG/1
4 TABLET, ORALLY DISINTEGRATING ORAL EVERY 8 HOURS PRN
Qty: 10 TABLET | Refills: 0 | Status: SHIPPED | OUTPATIENT
Start: 2024-05-09 | End: 2024-05-12

## 2024-05-09 RX ORDER — ONDANSETRON 2 MG/ML
4 INJECTION INTRAMUSCULAR; INTRAVENOUS
Status: COMPLETED | OUTPATIENT
Start: 2024-05-09 | End: 2024-05-09

## 2024-05-09 RX ADMIN — ONDANSETRON 4 MG: 2 INJECTION INTRAMUSCULAR; INTRAVENOUS at 00:44

## 2024-05-09 RX ADMIN — SODIUM CHLORIDE 500 ML: 9 INJECTION, SOLUTION INTRAVENOUS at 00:35

## 2024-05-09 ASSESSMENT — COLUMBIA-SUICIDE SEVERITY RATING SCALE - C-SSRS
6. HAVE YOU EVER DONE ANYTHING, STARTED TO DO ANYTHING, OR PREPARED TO DO ANYTHING TO END YOUR LIFE?: NO
2. HAVE YOU ACTUALLY HAD ANY THOUGHTS OF KILLING YOURSELF IN THE PAST MONTH?: NO
1. IN THE PAST MONTH, HAVE YOU WISHED YOU WERE DEAD OR WISHED YOU COULD GO TO SLEEP AND NOT WAKE UP?: NO

## 2024-05-09 ASSESSMENT — ACTIVITIES OF DAILY LIVING (ADL)
ADLS_ACUITY_SCORE: 37
ADLS_ACUITY_SCORE: 37

## 2024-05-09 NOTE — ED PROVIDER NOTES
"    History     Chief Complaint:  Vomiting       HPI   Chris Santo is a 39 year old female with a history of hypertension and prediabetes who presents to the ED with her  for evaluation of vomiting. The patient states she has had multiple episodes of emesis over the past 24 hours and has been unable to keep food or fluid down. Endorses some upper right epigastric pain and an intermittent feeling of warmth. Notes her symptoms came on unprovoked. Reports a past cholecystectomy about 3 years ago. Denies fever, diarrhea, dysuria, hematuria, and known sick exposures.    Independent Historian:    None     Medications:    Famotidine  Amlodipine  Estarylla  Cetrizine  Flexeril  Fluticasone  Hydrocodone  Irbesartan  Ditropan  Phenazopyridine  Phentermine  Wegovy  Tamsulosin  Trazodone     Past Medical History:    HTN  Prediabetes  Cholelithiasis  Lumbago  Obesity    Past Surgical History:    Cholecystectomy     Physical Exam   Patient Vitals for the past 24 hrs:   BP Temp Temp src Pulse Resp SpO2 Height Weight   05/09/24 0205 (!) 144/81 -- -- 86 -- 99 % -- --   05/09/24 0012 (!) 172/111 -- -- -- -- -- -- --   05/09/24 0010 -- 98.2  F (36.8  C) Temporal 110 16 96 % 1.702 m (5' 7\") 138.2 kg (304 lb 10.8 oz)        Physical Exam  General: Patient is awake, alert and interactive when I enter the room. Appears uncomfortable.   Head: The scalp, face, and head appear normal  Eyes: Conjunctivae and sclerae are normal  Neck: Normal range of motion.   CV: Regular rate.   Resp:  No respiratory distress.   GI: epigastric tenderness but abdomen is soft, no rigidity. No evidence of pulsatile mass. No fluid waves or evidence of ascites. No distension. No hernias or bruising are noted in detailed exam. No CVA tenderness.    MS: Normal tone.   Skin: Normal capillary refill noted  Neuro: Speech is normal and fluent. Face is symmetric. Moving all extremities.   Psych:  Normal affect.  Appropriate interactions.    Emergency " Department Course     Laboratory:  Labs Ordered and Resulted from Time of ED Arrival to Time of ED Departure   BASIC METABOLIC PANEL - Abnormal       Result Value    Sodium 140      Potassium 3.9      Chloride 104      Carbon Dioxide (CO2) 22      Anion Gap 14      Urea Nitrogen 11.7      Creatinine 0.50 (*)     GFR Estimate >90      Calcium 9.0      Glucose 96     CBC WITH PLATELETS - Abnormal    WBC Count 13.5 (*)     RBC Count 4.84      Hemoglobin 13.5      Hematocrit 41.2      MCV 85      MCH 27.9      MCHC 32.8      RDW 13.8      Platelet Count 322     INFLUENZA A/B, RSV, & SARS-COV2 PCR - Normal    Influenza A PCR Negative      Influenza B PCR Negative      RSV PCR Negative      SARS CoV2 PCR Negative     HCG QUALITATIVE PREGNANCY - Normal    hCG Serum Qualitative Negative     ROUTINE UA WITH MICROSCOPIC REFLEX TO CULTURE        Emergency Department Course & Assessments:    Interventions:  Medications   ondansetron (ZOFRAN) injection 4 mg (4 mg Intravenous $Given 5/9/24 0044)   sodium chloride 0.9% BOLUS 500 mL (0 mLs Intravenous Stopped 5/9/24 0204)        Assessments:  0044 I obtained history and performed a physical exam as noted above.     Independent Interpretation (X-rays, CTs, rhythm strip):  None    Consultations/Discussion of Management or Tests:  None     Social Determinants of Health affecting care:  None     Disposition:  The patient was discharged.    Impression & Plan      Medical Decision Making:  Chris Santo is a 39 year old female who presents for evaluation of nausea and vomiting with mild abdominal pain in a nonfocal abdominal exam. There are no ill contacts. I considered a broad differential diagnosis for this patient including viral gastroenteritis, food poisoning, bowel obstruction, intra-abdominal infection such as colitis, cholecystitis, UTI, pyelonephritis, appendicitis, etc.  Doubt new onset DKA. Doubt brain malignancy or increased ICP. She no signs of worrisome intra-abdominal  pathologies detected during the visit today.  She has a completely benign abdominal exam without rebound, guarding, or marked tenderness to palpation.  Supportive outpatient management is therefore indicated.  Abdominal pain precautions are given for home.    No indication for CT at this time.  It was discussed to return to the ED for blood in stool, increasing pain, or fevers more than 102.   She feels much improved after interventions in ED.   She passed oral challenge prior to d/c.     Diagnosis:    ICD-10-CM    1. Nausea and vomiting, unspecified vomiting type  R11.2       2. Epigastric pain  R10.13            Discharge Medications:  Discharge Medication List as of 5/9/2024  2:17 AM        START taking these medications    Details   ondansetron (ZOFRAN ODT) 4 MG ODT tab Take 1 tablet (4 mg) by mouth every 8 hours as needed for nausea, Disp-10 tablet, R-0, Local Print                Scribe Disclosure:  Alexa SUN, am serving as a scribe at 12:51 AM on 5/9/2024 to document services personally performed by Pavel Luna MD based on my observations and the provider's statements to me.  5/9/2024   Pavel Luna MD Battista, Christopher Joseph, MD  05/09/24 7631

## 2024-07-30 ENCOUNTER — HOSPITAL ENCOUNTER (EMERGENCY)
Facility: CLINIC | Age: 39
Discharge: HOME | End: 2024-07-30
Attending: PHYSICIAN ASSISTANT | Admitting: PHYSICIAN ASSISTANT
Payer: COMMERCIAL

## 2024-07-30 VITALS
SYSTOLIC BLOOD PRESSURE: 120 MMHG | TEMPERATURE: 99 F | RESPIRATION RATE: 29 BRPM | OXYGEN SATURATION: 98 % | DIASTOLIC BLOOD PRESSURE: 74 MMHG | HEART RATE: 103 BPM | HEIGHT: 64 IN | BODY MASS INDEX: 50.02 KG/M2 | WEIGHT: 293 LBS

## 2024-07-30 DIAGNOSIS — T78.2XXA ACUTE ANAPHYLAXIS, INITIAL ENCOUNTER: ICD-10-CM

## 2024-07-30 PROCEDURE — 96361 HYDRATE IV INFUSION ADD-ON: CPT

## 2024-07-30 PROCEDURE — 96374 THER/PROPH/DIAG INJ IV PUSH: CPT

## 2024-07-30 PROCEDURE — 258N000003 HC RX IP 258 OP 636: Performed by: PHYSICIAN ASSISTANT

## 2024-07-30 PROCEDURE — 99285 EMERGENCY DEPT VISIT HI MDM: CPT | Mod: 25

## 2024-07-30 PROCEDURE — 250N000011 HC RX IP 250 OP 636: Performed by: PHYSICIAN ASSISTANT

## 2024-07-30 PROCEDURE — 96375 TX/PRO/DX INJ NEW DRUG ADDON: CPT

## 2024-07-30 PROCEDURE — 250N000011 HC RX IP 250 OP 636: Performed by: EMERGENCY MEDICINE

## 2024-07-30 RX ORDER — EPINEPHRINE 0.3 MG/.3ML
0.3 INJECTION SUBCUTANEOUS
Qty: 1 EACH | Refills: 0 | Status: SHIPPED | OUTPATIENT
Start: 2024-07-30

## 2024-07-30 RX ORDER — FAMOTIDINE 20 MG/1
20 TABLET, FILM COATED ORAL 2 TIMES DAILY
Qty: 10 TABLET | Refills: 0 | Status: SHIPPED | OUTPATIENT
Start: 2024-07-30

## 2024-07-30 RX ORDER — DIPHENHYDRAMINE HYDROCHLORIDE 50 MG/ML
50 INJECTION INTRAMUSCULAR; INTRAVENOUS ONCE
Status: COMPLETED | OUTPATIENT
Start: 2024-07-30 | End: 2024-07-30

## 2024-07-30 RX ORDER — DIPHENHYDRAMINE HCL 25 MG
25 CAPSULE ORAL EVERY 6 HOURS PRN
Qty: 30 CAPSULE | Refills: 0 | Status: SHIPPED | OUTPATIENT
Start: 2024-07-30

## 2024-07-30 RX ORDER — METHYLPREDNISOLONE SODIUM SUCCINATE 125 MG/2ML
125 INJECTION, POWDER, LYOPHILIZED, FOR SOLUTION INTRAMUSCULAR; INTRAVENOUS ONCE
Status: COMPLETED | OUTPATIENT
Start: 2024-07-30 | End: 2024-07-30

## 2024-07-30 RX ORDER — PREDNISONE 20 MG/1
TABLET ORAL
Qty: 8 TABLET | Refills: 0 | Status: SHIPPED | OUTPATIENT
Start: 2024-07-31

## 2024-07-30 RX ADMIN — DIPHENHYDRAMINE HYDROCHLORIDE 50 MG: 50 INJECTION, SOLUTION INTRAMUSCULAR; INTRAVENOUS at 19:23

## 2024-07-30 RX ADMIN — METHYLPREDNISOLONE SODIUM SUCCINATE 125 MG: 125 INJECTION INTRAMUSCULAR; INTRAVENOUS at 15:00

## 2024-07-30 RX ADMIN — SODIUM CHLORIDE 1000 ML: 9 INJECTION, SOLUTION INTRAVENOUS at 15:00

## 2024-07-30 RX ADMIN — FAMOTIDINE 20 MG: 10 INJECTION, SOLUTION INTRAVENOUS at 15:00

## 2024-07-30 ASSESSMENT — ACTIVITIES OF DAILY LIVING (ADL)
ADLS_ACUITY_SCORE: 37

## 2024-07-30 ASSESSMENT — COLUMBIA-SUICIDE SEVERITY RATING SCALE - C-SSRS
2. HAVE YOU ACTUALLY HAD ANY THOUGHTS OF KILLING YOURSELF IN THE PAST MONTH?: NO
6. HAVE YOU EVER DONE ANYTHING, STARTED TO DO ANYTHING, OR PREPARED TO DO ANYTHING TO END YOUR LIFE?: NO
1. IN THE PAST MONTH, HAVE YOU WISHED YOU WERE DEAD OR WISHED YOU COULD GO TO SLEEP AND NOT WAKE UP?: NO

## 2024-07-30 NOTE — ED PROVIDER NOTES
Emergency Department Note      History of Present Illness     Chief Complaint   Allergic Reaction    HPI   Chris Santo is a 39 year old female who presents to the ED with significant other for evaluation of allergic reaction.  Patient notes shortly prior to presentation to the ED she was stung by 3 bees to the left upper extremity.  No history of allergic reaction or anaphylaxis in the past.  She states about 5 minutes after receiving the bee sting she began to develop an itchy rash to her body diffusely. She states she immediately thereafter took what is likely a second generation oral antihistamine, and about 10 minutes after taking the antihistamine began to feel like her throat was swelling and she was having difficulty breathing and became nauseated, prompting presentation to the ED. She denies any history of anaphylaxis in the past. Never been hospitalized for anaphylaxis.    Independent Historian   None    Review of External Notes   None    Past Medical History     Medical History and Problem List   Past Medical History:   Diagnosis Date    Hypertension        Medications   acetaminophen (TYLENOL) 325 MG tablet  docusate sodium (COLACE) 100 MG capsule  famotidine (PEPCID) 10 MG tablet  ibuprofen (ADVIL/MOTRIN) 800 MG tablet  Misc. Devices (BREAST PUMP) MISC  Misc. Devices (BREAST PUMP) MISC  Prenatal Vit-Fe Fumarate-FA (PRENATAL MULTIVITAMIN  PLUS IRON) 27-0.8 MG TABS        Surgical History   Past Surgical History:   Procedure Laterality Date    NO PAST SURGERIES      ZZC LAP,CHOLECYSTECTOMY/EXPLORE N/A 6/3/2021    Procedure: CHOLECYSTECTOMY, LAPAROSCOPIC;  Surgeon: Stephen Hall MD;  Location: [facility];  Service: General       Physical Exam     Patient Vitals for the past 24 hrs:   BP Temp Temp src Pulse Resp SpO2 Height Weight   07/30/24 1612 -- -- -- 87 -- 98 % -- --   07/30/24 1557 129/70 -- -- 94 -- -- -- --   07/30/24 1556 -- -- -- 94 -- 98 % -- --   07/30/24 1541 113/63 -- -- 86  "-- 98 % -- --   07/30/24 1526 108/65 -- -- 96 -- 99 % -- --   07/30/24 1511 114/74 -- -- 89 -- 97 % -- --   07/30/24 1501 110/80 -- -- 74 20 97 % 1.626 m (5' 4\") 145.2 kg (320 lb)   07/30/24 1500 -- -- -- 84 -- 96 % -- --   07/30/24 1455 103/83 -- -- 92 -- 93 % -- --   07/30/24 1450 (!) 84/65 99  F (37.2  C) Oral 88 24 97 % -- --   07/30/24 1445 (!) 84/65 -- -- 96 -- 97 % -- --   07/30/24 1440 (!) 86/69 -- -- 88 -- (!) 85 % -- --     Physical Exam  Constitutional: Pleasant. Cooperative.   Eyes: Pupils equally round and reactive  HENT: Head is normal in appearance. Oropharynx is normal with moist mucus membranes. No posterior oropharyngeal edema.  Cardiovascular: Regular rate and rhythm and without murmurs.  Respiratory: Normal respiratory effort, lungs are clear bilaterally. No wheezing.  GI: Abdomen is soft, non-tender, non-distended. No guarding, rebound, or rigidity.  Musculoskeletal: No asymmetry of the lower extremities.  Skin: Diffuse urticaria noted to bilateral upper extremities, chest, back, abdomen, bilateral lower extremities.  Neurologic: Cranial nerves grossly intact, normal cognition, no focal deficits. Alert and oriented x 3.   Psychiatric: Normal affect.  Nursing notes and vital signs reviewed.      Diagnostics     Lab Results   Labs Ordered and Resulted from Time of ED Arrival to Time of ED Departure - No data to display    Imaging   No orders to display     Independent Interpretation   None    ED Course      Medications Administered   Medications   sodium chloride 0.9% BOLUS 1,000 mL (0 mLs Intravenous Stopped 7/30/24 1556)   famotidine (PEPCID) injection 20 mg (20 mg Intravenous $Given 7/30/24 1500)   methylPREDNISolone sodium succinate (solu-MEDROL) injection 125 mg (125 mg Intravenous $Given 7/30/24 1500)       Procedures   Procedures     Discussion of Management   1700 pt signed out to Dr. Crystal for ongoing monitoring and ultimate disposition    ED Course   1515 recheck, no oropharyngeal " symptoms  1600 recheck, asymptomatic.     Optional/Additional Documentation  None    Medical Decision Making / Diagnosis     CMS Diagnoses: None    MIPS       None    MDM   Chris Santo is a 39 year old female who presents ED for evaluation of rash, throat swelling, difficulty breathing, nausea, in the setting of hives following a bee sting.  See HPI as above for additional details.  Vitals and physical exam as above.  Exam concerning for anaphylaxis, patient noted to be hypotensive.  Epinephrine provided immediately.  Patient took second-generation antihistamine at home.  Patient provided with famotidine and Solu-Medrol here.  Patient monitored for 2 hours with no recurrence of symptoms, at which time patient was signed out to Dr. Crystal for ongoing monitoring and management. Patient stable at time of this dictation.    Disposition   Care of the patient was transferred to my colleague Dr. Crystal for ongoing monitoring following Epi.     Diagnosis     ICD-10-CM    1. Acute anaphylaxis, initial encounter  T78.2XXA          This record was created at least in part using electronic voice recognition software, so please excuse any typographical errors.       Dawit Kim PA-C  07/30/24 9971

## 2024-07-30 NOTE — ED TRIAGE NOTES
Pt arrives with  post 3 bee stings. Redness, swelling, and throat swelling/scratchy. Hives all over body.      Triage Assessment (Adult)       Row Name 07/30/24 6182          Triage Assessment    Airway WDL --  Swelling/SOB        Respiratory WDL    Respiratory WDL WDL        Skin Circulation/Temperature WDL    Skin Circulation/Temperature WDL X  flushed        Cardiac WDL    Cardiac WDL WDL        Peripheral/Neurovascular WDL    Peripheral Neurovascular WDL WDL        Cognitive/Neuro/Behavioral WDL    Cognitive/Neuro/Behavioral WDL WDL

## 2024-07-31 NOTE — ED PROVIDER NOTES
This patient was taken in signout.  She had anaphylaxis for multiple bee stings.  She has not been observed for over 7 hours.  She did have a slight recurrence of her rash on her arms.  This resolved with Benadryl.  We discussed the potential need for admission and she would much prefer to go home.  The rash is not resolving again and we discussed that likely will wax and wane.  She will 4 more days of prednisone as well as antihistamines.  She is prescribed an EpiPen.  We discussed very specific return precautions.  She was advised to pursue formal allergy testing through her clinic.     Michael Crystal MD  07/30/24 5686

## 2024-07-31 NOTE — DISCHARGE INSTRUCTIONS
Return to the ER for difficulty breathing, worsening rash, or any new concerns.    Please follow-up with your primary care physician to arrange formal allergy testing.